# Patient Record
Sex: MALE | Race: WHITE | NOT HISPANIC OR LATINO | Employment: OTHER | URBAN - METROPOLITAN AREA
[De-identification: names, ages, dates, MRNs, and addresses within clinical notes are randomized per-mention and may not be internally consistent; named-entity substitution may affect disease eponyms.]

---

## 2018-08-19 ENCOUNTER — HOSPITAL ENCOUNTER (INPATIENT)
Facility: HOSPITAL | Age: 58
LOS: 10 days | Discharge: HOME | DRG: 286 | End: 2018-08-29
Attending: INTERNAL MEDICINE | Admitting: INTERNAL MEDICINE
Payer: MEDICARE

## 2018-08-19 DIAGNOSIS — I42.8 NICM (NONISCHEMIC CARDIOMYOPATHY): ICD-10-CM

## 2018-08-19 DIAGNOSIS — E78.2 MIXED HYPERLIPIDEMIA: ICD-10-CM

## 2018-08-19 DIAGNOSIS — I47.20 V-TACH: ICD-10-CM

## 2018-08-19 DIAGNOSIS — I50.9 ACUTE DECOMPENSATED HEART FAILURE: ICD-10-CM

## 2018-08-19 DIAGNOSIS — I50.43 ACUTE ON CHRONIC COMBINED SYSTOLIC AND DIASTOLIC HEART FAILURE: ICD-10-CM

## 2018-08-19 DIAGNOSIS — I48.91 ATRIAL FIBRILLATION: ICD-10-CM

## 2018-08-19 DIAGNOSIS — I10 HYPERTENSION, UNSPECIFIED TYPE: ICD-10-CM

## 2018-08-19 DIAGNOSIS — J43.9 PULMONARY EMPHYSEMA, UNSPECIFIED EMPHYSEMA TYPE: ICD-10-CM

## 2018-08-19 DIAGNOSIS — I48.20 CHRONIC ATRIAL FIBRILLATION: ICD-10-CM

## 2018-08-19 DIAGNOSIS — I50.9 HEART FAILURE: ICD-10-CM

## 2018-08-19 DIAGNOSIS — N17.9 ACUTE RENAL FAILURE, UNSPECIFIED ACUTE RENAL FAILURE TYPE: ICD-10-CM

## 2018-08-19 DIAGNOSIS — N17.9 ACUTE KIDNEY FAILURE: ICD-10-CM

## 2018-08-19 PROCEDURE — 20600001 HC STEP DOWN PRIVATE ROOM

## 2018-08-19 RX ORDER — DOXYCYCLINE 100 MG/1
100 CAPSULE ORAL EVERY 12 HOURS
Status: ON HOLD | COMMUNITY
End: 2018-08-29 | Stop reason: HOSPADM

## 2018-08-19 RX ORDER — DEXTROSE MONOHYDRATE 25 G/50ML
25 INJECTION, SOLUTION INTRAVENOUS DAILY PRN
COMMUNITY

## 2018-08-19 RX ORDER — MUPIROCIN CALCIUM 20 MG/G
CREAM TOPICAL 2 TIMES DAILY
COMMUNITY

## 2018-08-19 RX ORDER — FLUTICASONE FUROATE AND VILANTEROL 200; 25 UG/1; UG/1
1 POWDER RESPIRATORY (INHALATION) DAILY
COMMUNITY

## 2018-08-19 RX ORDER — FUROSEMIDE 20 MG/1
20 TABLET ORAL 2 TIMES DAILY
Status: ON HOLD | COMMUNITY
End: 2018-08-29 | Stop reason: SDUPTHER

## 2018-08-19 RX ORDER — GLUCAGON 1 MG/ML
KIT INJECTION
COMMUNITY

## 2018-08-19 RX ORDER — PANTOPRAZOLE SODIUM 40 MG/1
40 TABLET, DELAYED RELEASE ORAL DAILY
COMMUNITY

## 2018-08-19 RX ORDER — ONDANSETRON 4 MG/1
4 TABLET, FILM COATED ORAL
COMMUNITY

## 2018-08-19 RX ORDER — METOPROLOL SUCCINATE 25 MG/1
25 TABLET, EXTENDED RELEASE ORAL 3 TIMES DAILY
Status: ON HOLD | COMMUNITY
End: 2018-08-29 | Stop reason: SDUPTHER

## 2018-08-19 RX ORDER — IBUPROFEN 200 MG
1 TABLET ORAL
COMMUNITY

## 2018-08-20 PROBLEM — I47.20 V-TACH: Status: ACTIVE | Noted: 2018-08-20

## 2018-08-20 PROBLEM — I42.8 NICM (NONISCHEMIC CARDIOMYOPATHY): Status: ACTIVE | Noted: 2018-08-20

## 2018-08-20 PROBLEM — K21.9 GERD (GASTROESOPHAGEAL REFLUX DISEASE): Status: ACTIVE | Noted: 2018-08-20

## 2018-08-20 PROBLEM — J43.9 PULMONARY EMPHYSEMA: Status: ACTIVE | Noted: 2018-08-20

## 2018-08-20 PROBLEM — E78.5 HLD (HYPERLIPIDEMIA): Status: ACTIVE | Noted: 2018-08-20

## 2018-08-20 PROBLEM — I10 HYPERTENSION: Status: ACTIVE | Noted: 2018-08-20

## 2018-08-20 PROBLEM — N17.9 AKI (ACUTE KIDNEY INJURY): Status: ACTIVE | Noted: 2018-08-20

## 2018-08-20 PROBLEM — R57.0 CARDIOGENIC SHOCK: Status: ACTIVE | Noted: 2018-08-20

## 2018-08-20 PROBLEM — R79.89 ELEVATED LFTS: Status: ACTIVE | Noted: 2018-08-20

## 2018-08-20 PROBLEM — I48.91 A-FIB: Status: ACTIVE | Noted: 2018-08-20

## 2018-08-20 PROBLEM — F41.9 ANXIETY: Status: ACTIVE | Noted: 2018-08-20

## 2018-08-20 LAB
ALBUMIN SERPL BCP-MCNC: 3.2 G/DL
ALBUMIN SERPL BCP-MCNC: 3.2 G/DL
ALLENS TEST: ABNORMAL
ALP SERPL-CCNC: 95 U/L
ALP SERPL-CCNC: 95 U/L
ALT SERPL W/O P-5'-P-CCNC: 365 U/L
ALT SERPL W/O P-5'-P-CCNC: 365 U/L
ANION GAP SERPL CALC-SCNC: 10 MMOL/L
APTT BLDCRRT: 31.6 SEC
APTT BLDCRRT: 33.1 SEC
AST SERPL-CCNC: 51 U/L
AST SERPL-CCNC: 51 U/L
BASOPHILS # BLD AUTO: 0.02 K/UL
BASOPHILS # BLD AUTO: 0.02 K/UL
BASOPHILS NFR BLD: 0.2 %
BASOPHILS NFR BLD: 0.2 %
BILIRUB SERPL-MCNC: 1.2 MG/DL
BILIRUB SERPL-MCNC: 1.2 MG/DL
BILIRUB UR QL STRIP: NEGATIVE
BNP SERPL-MCNC: 1860 PG/ML
BUN SERPL-MCNC: 21 MG/DL
BUN SERPL-MCNC: 23 MG/DL
CALCIUM SERPL-MCNC: 9.2 MG/DL
CALCIUM SERPL-MCNC: 9.3 MG/DL
CALCIUM SERPL-MCNC: 9.4 MG/DL
CALCIUM SERPL-MCNC: 9.4 MG/DL
CHLORIDE SERPL-SCNC: 91 MMOL/L
CHLORIDE SERPL-SCNC: 96 MMOL/L
CHLORIDE SERPL-SCNC: 96 MMOL/L
CHLORIDE SERPL-SCNC: 97 MMOL/L
CHOLEST SERPL-MCNC: 97 MG/DL
CHOLEST/HDLC SERPL: 3.2 {RATIO}
CLARITY UR REFRACT.AUTO: CLEAR
CO2 SERPL-SCNC: 30 MMOL/L
CO2 SERPL-SCNC: 37 MMOL/L
COLOR UR AUTO: NORMAL
CREAT SERPL-MCNC: 1.1 MG/DL
CREAT SERPL-MCNC: 1.3 MG/DL
DELSYS: ABNORMAL
DIASTOLIC DYSFUNCTION: YES
DIFFERENTIAL METHOD: ABNORMAL
DIFFERENTIAL METHOD: ABNORMAL
EOSINOPHIL # BLD AUTO: 0.1 K/UL
EOSINOPHIL # BLD AUTO: 0.1 K/UL
EOSINOPHIL NFR BLD: 0.9 %
EOSINOPHIL NFR BLD: 0.9 %
ERYTHROCYTE [DISTWIDTH] IN BLOOD BY AUTOMATED COUNT: 16.8 %
ERYTHROCYTE [DISTWIDTH] IN BLOOD BY AUTOMATED COUNT: 16.8 %
EST. GFR  (AFRICAN AMERICAN): >60 ML/MIN/1.73 M^2
EST. GFR  (NON AFRICAN AMERICAN): >60 ML/MIN/1.73 M^2
ESTIMATED AVG GLUCOSE: 166 MG/DL
ESTIMATED PA SYSTOLIC PRESSURE: 51
FLOW: 2
GLUCOSE SERPL-MCNC: 131 MG/DL
GLUCOSE SERPL-MCNC: 132 MG/DL
GLUCOSE SERPL-MCNC: 132 MG/DL
GLUCOSE SERPL-MCNC: 212 MG/DL
GLUCOSE UR QL STRIP: NEGATIVE
HBA1C MFR BLD HPLC: 7.4 %
HCO3 UR-SCNC: 36.3 MMOL/L (ref 24–28)
HCT VFR BLD AUTO: 39.4 %
HCT VFR BLD AUTO: 39.4 %
HDLC SERPL-MCNC: 30 MG/DL
HDLC SERPL: 30.9 %
HGB BLD-MCNC: 12.6 G/DL
HGB BLD-MCNC: 12.6 G/DL
HGB UR QL STRIP: NEGATIVE
IMM GRANULOCYTES # BLD AUTO: 0.03 K/UL
IMM GRANULOCYTES # BLD AUTO: 0.03 K/UL
IMM GRANULOCYTES NFR BLD AUTO: 0.4 %
IMM GRANULOCYTES NFR BLD AUTO: 0.4 %
INR PPP: 1.6
KETONES UR QL STRIP: NEGATIVE
LACTATE SERPL-SCNC: 1 MMOL/L
LDLC SERPL CALC-MCNC: 56.4 MG/DL
LEUKOCYTE ESTERASE UR QL STRIP: NEGATIVE
LYMPHOCYTES # BLD AUTO: 1.9 K/UL
LYMPHOCYTES # BLD AUTO: 1.9 K/UL
LYMPHOCYTES NFR BLD: 22.9 %
LYMPHOCYTES NFR BLD: 22.9 %
MAGNESIUM SERPL-MCNC: 1.7 MG/DL
MAGNESIUM SERPL-MCNC: 1.7 MG/DL
MAGNESIUM SERPL-MCNC: 1.9 MG/DL
MCH RBC QN AUTO: 28.4 PG
MCH RBC QN AUTO: 28.4 PG
MCHC RBC AUTO-ENTMCNC: 32 G/DL
MCHC RBC AUTO-ENTMCNC: 32 G/DL
MCV RBC AUTO: 89 FL
MCV RBC AUTO: 89 FL
MITRAL VALVE REGURGITATION: ABNORMAL
MODE: ABNORMAL
MONOCYTES # BLD AUTO: 0.7 K/UL
MONOCYTES # BLD AUTO: 0.7 K/UL
MONOCYTES NFR BLD: 8.4 %
MONOCYTES NFR BLD: 8.4 %
NEUTROPHILS # BLD AUTO: 5.7 K/UL
NEUTROPHILS # BLD AUTO: 5.7 K/UL
NEUTROPHILS NFR BLD: 67.2 %
NEUTROPHILS NFR BLD: 67.2 %
NITRITE UR QL STRIP: NEGATIVE
NONHDLC SERPL-MCNC: 67 MG/DL
NRBC BLD-RTO: 0 /100 WBC
NRBC BLD-RTO: 0 /100 WBC
PCO2 BLDA: 50.3 MMHG (ref 35–45)
PH SMN: 7.47 [PH] (ref 7.35–7.45)
PH UR STRIP: 7 [PH] (ref 5–8)
PHOSPHATE SERPL-MCNC: 3.9 MG/DL
PHOSPHATE SERPL-MCNC: 3.9 MG/DL
PLATELET # BLD AUTO: 81 K/UL
PLATELET # BLD AUTO: 81 K/UL
PMV BLD AUTO: 9.7 FL
PMV BLD AUTO: 9.7 FL
PO2 BLDA: 70 MMHG (ref 40–60)
POC BE: 13 MMOL/L
POC SATURATED O2: 94 % (ref 95–100)
POC TCO2: 38 MMOL/L (ref 24–29)
POCT GLUCOSE: 102 MG/DL (ref 70–110)
POCT GLUCOSE: 112 MG/DL (ref 70–110)
POCT GLUCOSE: 126 MG/DL (ref 70–110)
POCT GLUCOSE: 130 MG/DL (ref 70–110)
POCT GLUCOSE: 290 MG/DL (ref 70–110)
POTASSIUM SERPL-SCNC: 4.1 MMOL/L
POTASSIUM SERPL-SCNC: 4.8 MMOL/L
POTASSIUM SERPL-SCNC: 4.8 MMOL/L
POTASSIUM SERPL-SCNC: 5 MMOL/L
PROT SERPL-MCNC: 6.1 G/DL
PROT SERPL-MCNC: 6.1 G/DL
PROT UR QL STRIP: NEGATIVE
PROTHROMBIN TIME: 15.8 SEC
RBC # BLD AUTO: 4.43 M/UL
RBC # BLD AUTO: 4.43 M/UL
RETIRED EF AND QEF - SEE NOTES: 15 (ref 55–65)
SAMPLE: ABNORMAL
SITE: ABNORMAL
SODIUM SERPL-SCNC: 136 MMOL/L
SODIUM SERPL-SCNC: 136 MMOL/L
SODIUM SERPL-SCNC: 137 MMOL/L
SODIUM SERPL-SCNC: 138 MMOL/L
SP GR UR STRIP: 1.01 (ref 1–1.03)
TRICUSPID VALVE REGURGITATION: ABNORMAL
TRIGL SERPL-MCNC: 53 MG/DL
TROPONIN I SERPL DL<=0.01 NG/ML-MCNC: 0.03 NG/ML
TSH SERPL DL<=0.005 MIU/L-ACNC: 2.32 UIU/ML
URN SPEC COLLECT METH UR: NORMAL
UROBILINOGEN UR STRIP-ACNC: NEGATIVE EU/DL
WBC # BLD AUTO: 8.48 K/UL
WBC # BLD AUTO: 8.48 K/UL

## 2018-08-20 PROCEDURE — S4991 NICOTINE PATCH NONLEGEND: HCPCS | Performed by: STUDENT IN AN ORGANIZED HEALTH CARE EDUCATION/TRAINING PROGRAM

## 2018-08-20 PROCEDURE — 83880 ASSAY OF NATRIURETIC PEPTIDE: CPT

## 2018-08-20 PROCEDURE — 84100 ASSAY OF PHOSPHORUS: CPT

## 2018-08-20 PROCEDURE — 63600175 PHARM REV CODE 636 W HCPCS: Performed by: STUDENT IN AN ORGANIZED HEALTH CARE EDUCATION/TRAINING PROGRAM

## 2018-08-20 PROCEDURE — 85025 COMPLETE CBC W/AUTO DIFF WBC: CPT

## 2018-08-20 PROCEDURE — 99900035 HC TECH TIME PER 15 MIN (STAT)

## 2018-08-20 PROCEDURE — 85730 THROMBOPLASTIN TIME PARTIAL: CPT | Mod: 91

## 2018-08-20 PROCEDURE — 93306 TTE W/DOPPLER COMPLETE: CPT

## 2018-08-20 PROCEDURE — 83605 ASSAY OF LACTIC ACID: CPT

## 2018-08-20 PROCEDURE — 99222 1ST HOSP IP/OBS MODERATE 55: CPT | Mod: ,,, | Performed by: INTERNAL MEDICINE

## 2018-08-20 PROCEDURE — 83735 ASSAY OF MAGNESIUM: CPT

## 2018-08-20 PROCEDURE — 80048 BASIC METABOLIC PNL TOTAL CA: CPT

## 2018-08-20 PROCEDURE — 84484 ASSAY OF TROPONIN QUANT: CPT

## 2018-08-20 PROCEDURE — 80048 BASIC METABOLIC PNL TOTAL CA: CPT | Mod: 91

## 2018-08-20 PROCEDURE — 84443 ASSAY THYROID STIM HORMONE: CPT

## 2018-08-20 PROCEDURE — 93306 TTE W/DOPPLER COMPLETE: CPT | Mod: 26,,, | Performed by: INTERNAL MEDICINE

## 2018-08-20 PROCEDURE — 93010 ELECTROCARDIOGRAM REPORT: CPT | Mod: ,,, | Performed by: INTERNAL MEDICINE

## 2018-08-20 PROCEDURE — 80053 COMPREHEN METABOLIC PANEL: CPT

## 2018-08-20 PROCEDURE — 83735 ASSAY OF MAGNESIUM: CPT | Mod: 91

## 2018-08-20 PROCEDURE — 36415 COLL VENOUS BLD VENIPUNCTURE: CPT

## 2018-08-20 PROCEDURE — 63600175 PHARM REV CODE 636 W HCPCS: Performed by: PHYSICIAN ASSISTANT

## 2018-08-20 PROCEDURE — 93005 ELECTROCARDIOGRAM TRACING: CPT

## 2018-08-20 PROCEDURE — 85730 THROMBOPLASTIN TIME PARTIAL: CPT

## 2018-08-20 PROCEDURE — 80061 LIPID PANEL: CPT

## 2018-08-20 PROCEDURE — 25000242 PHARM REV CODE 250 ALT 637 W/ HCPCS: Performed by: STUDENT IN AN ORGANIZED HEALTH CARE EDUCATION/TRAINING PROGRAM

## 2018-08-20 PROCEDURE — 99223 1ST HOSP IP/OBS HIGH 75: CPT | Mod: ,,, | Performed by: INTERNAL MEDICINE

## 2018-08-20 PROCEDURE — 83036 HEMOGLOBIN GLYCOSYLATED A1C: CPT

## 2018-08-20 PROCEDURE — 85610 PROTHROMBIN TIME: CPT

## 2018-08-20 PROCEDURE — A4216 STERILE WATER/SALINE, 10 ML: HCPCS | Performed by: STUDENT IN AN ORGANIZED HEALTH CARE EDUCATION/TRAINING PROGRAM

## 2018-08-20 PROCEDURE — 25000003 PHARM REV CODE 250: Performed by: STUDENT IN AN ORGANIZED HEALTH CARE EDUCATION/TRAINING PROGRAM

## 2018-08-20 PROCEDURE — 81003 URINALYSIS AUTO W/O SCOPE: CPT

## 2018-08-20 PROCEDURE — 25000003 PHARM REV CODE 250: Performed by: PHYSICIAN ASSISTANT

## 2018-08-20 PROCEDURE — 20600001 HC STEP DOWN PRIVATE ROOM

## 2018-08-20 PROCEDURE — 82803 BLOOD GASES ANY COMBINATION: CPT

## 2018-08-20 RX ORDER — FUROSEMIDE 10 MG/ML
80 INJECTION INTRAMUSCULAR; INTRAVENOUS ONCE
Status: COMPLETED | OUTPATIENT
Start: 2018-08-20 | End: 2018-08-20

## 2018-08-20 RX ORDER — MAGNESIUM SULFATE HEPTAHYDRATE 40 MG/ML
2 INJECTION, SOLUTION INTRAVENOUS ONCE
Status: COMPLETED | OUTPATIENT
Start: 2018-08-20 | End: 2018-08-20

## 2018-08-20 RX ORDER — ACETAMINOPHEN 325 MG/1
650 TABLET ORAL EVERY 4 HOURS PRN
Status: DISCONTINUED | OUTPATIENT
Start: 2018-08-20 | End: 2018-08-29 | Stop reason: HOSPADM

## 2018-08-20 RX ORDER — LANOLIN ALCOHOL/MO/W.PET/CERES
400 CREAM (GRAM) TOPICAL DAILY
Status: DISCONTINUED | OUTPATIENT
Start: 2018-08-20 | End: 2018-08-21

## 2018-08-20 RX ORDER — POLYETHYLENE GLYCOL 3350 17 G/17G
17 POWDER, FOR SOLUTION ORAL DAILY
Status: DISCONTINUED | OUTPATIENT
Start: 2018-08-20 | End: 2018-08-29 | Stop reason: HOSPADM

## 2018-08-20 RX ORDER — HEPARIN SODIUM,PORCINE/D5W 25000/250
12 INTRAVENOUS SOLUTION INTRAVENOUS CONTINUOUS
Status: DISCONTINUED | OUTPATIENT
Start: 2018-08-20 | End: 2018-08-23

## 2018-08-20 RX ORDER — GLUCAGON 1 MG
1 KIT INJECTION
Status: DISCONTINUED | OUTPATIENT
Start: 2018-08-20 | End: 2018-08-29 | Stop reason: HOSPADM

## 2018-08-20 RX ORDER — ENOXAPARIN SODIUM 100 MG/ML
1 INJECTION SUBCUTANEOUS ONCE
Status: DISCONTINUED | OUTPATIENT
Start: 2018-08-20 | End: 2018-08-20

## 2018-08-20 RX ORDER — PANTOPRAZOLE SODIUM 40 MG/1
40 TABLET, DELAYED RELEASE ORAL DAILY
Status: DISCONTINUED | OUTPATIENT
Start: 2018-08-20 | End: 2018-08-29 | Stop reason: HOSPADM

## 2018-08-20 RX ORDER — SODIUM CHLORIDE 0.9 % (FLUSH) 0.9 %
3 SYRINGE (ML) INJECTION EVERY 8 HOURS
Status: DISCONTINUED | OUTPATIENT
Start: 2018-08-20 | End: 2018-08-29 | Stop reason: HOSPADM

## 2018-08-20 RX ORDER — IBUPROFEN 200 MG
1 TABLET ORAL
Status: DISCONTINUED | OUTPATIENT
Start: 2018-08-20 | End: 2018-08-20

## 2018-08-20 RX ORDER — ALBUTEROL SULFATE 90 UG/1
2 AEROSOL, METERED RESPIRATORY (INHALATION) EVERY 6 HOURS PRN
Status: DISCONTINUED | OUTPATIENT
Start: 2018-08-20 | End: 2018-08-29 | Stop reason: HOSPADM

## 2018-08-20 RX ORDER — IBUPROFEN 200 MG
1 TABLET ORAL
Status: DISCONTINUED | OUTPATIENT
Start: 2018-08-20 | End: 2018-08-29 | Stop reason: HOSPADM

## 2018-08-20 RX ORDER — ONDANSETRON 4 MG/1
4 TABLET, FILM COATED ORAL EVERY 6 HOURS PRN
Status: DISCONTINUED | OUTPATIENT
Start: 2018-08-20 | End: 2018-08-29 | Stop reason: HOSPADM

## 2018-08-20 RX ORDER — FUROSEMIDE 20 MG/1
20 TABLET ORAL 2 TIMES DAILY
Status: DISCONTINUED | OUTPATIENT
Start: 2018-08-20 | End: 2018-08-20

## 2018-08-20 RX ORDER — INSULIN ASPART 100 [IU]/ML
0-5 INJECTION, SOLUTION INTRAVENOUS; SUBCUTANEOUS EVERY 6 HOURS PRN
Status: DISCONTINUED | OUTPATIENT
Start: 2018-08-20 | End: 2018-08-29 | Stop reason: HOSPADM

## 2018-08-20 RX ORDER — FLUTICASONE FUROATE AND VILANTEROL 200; 25 UG/1; UG/1
1 POWDER RESPIRATORY (INHALATION) DAILY
Status: DISCONTINUED | OUTPATIENT
Start: 2018-08-20 | End: 2018-08-29 | Stop reason: HOSPADM

## 2018-08-20 RX ORDER — METOPROLOL SUCCINATE 25 MG/1
25 TABLET, EXTENDED RELEASE ORAL DAILY
Status: DISCONTINUED | OUTPATIENT
Start: 2018-08-20 | End: 2018-08-20

## 2018-08-20 RX ORDER — FUROSEMIDE 10 MG/ML
80 INJECTION INTRAMUSCULAR; INTRAVENOUS 2 TIMES DAILY
Status: DISCONTINUED | OUTPATIENT
Start: 2018-08-20 | End: 2018-08-20

## 2018-08-20 RX ADMIN — FUROSEMIDE 10 MG/HR: 10 INJECTION, SOLUTION INTRAMUSCULAR; INTRAVENOUS at 10:08

## 2018-08-20 RX ADMIN — MAGNESIUM SULFATE IN WATER 2 G: 40 INJECTION, SOLUTION INTRAVENOUS at 04:08

## 2018-08-20 RX ADMIN — POLYETHYLENE GLYCOL 3350 17 G: 17 POWDER, FOR SOLUTION ORAL at 09:08

## 2018-08-20 RX ADMIN — FUROSEMIDE 80 MG: 10 INJECTION, SOLUTION INTRAMUSCULAR; INTRAVENOUS at 10:08

## 2018-08-20 RX ADMIN — PANTOPRAZOLE SODIUM 40 MG: 40 TABLET, DELAYED RELEASE ORAL at 09:08

## 2018-08-20 RX ADMIN — Medication 3 ML: at 09:08

## 2018-08-20 RX ADMIN — HEPARIN SODIUM AND DEXTROSE 12 UNITS/KG/HR: 10000; 5 INJECTION INTRAVENOUS at 12:08

## 2018-08-20 RX ADMIN — HEPARIN SODIUM AND DEXTROSE 14 UNITS/KG/HR: 10000; 5 INJECTION INTRAVENOUS at 07:08

## 2018-08-20 RX ADMIN — MAGNESIUM OXIDE TAB 400 MG (241.3 MG ELEMENTAL MG) 400 MG: 400 (241.3 MG) TAB at 09:08

## 2018-08-20 RX ADMIN — AMIODARONE HYDROCHLORIDE 150 MG: 1.5 INJECTION, SOLUTION INTRAVENOUS at 08:08

## 2018-08-20 RX ADMIN — AMIODARONE HYDROCHLORIDE 1 MG/MIN: 1.8 INJECTION, SOLUTION INTRAVENOUS at 08:08

## 2018-08-20 RX ADMIN — NICOTINE 1 PATCH: 21 PATCH, EXTENDED RELEASE TRANSDERMAL at 09:08

## 2018-08-20 RX ADMIN — INSULIN ASPART 3 UNITS: 100 INJECTION, SOLUTION INTRAVENOUS; SUBCUTANEOUS at 06:08

## 2018-08-20 RX ADMIN — FLUTICASONE FUROATE AND VILANTEROL TRIFENATATE 1 PUFF: 200; 25 POWDER RESPIRATORY (INHALATION) at 09:08

## 2018-08-20 NOTE — HPI
Mr. Harley is a 58 year old male with a PMH significant for HTN, HLD, PE on Xarelto, MARK, GERD, schizophrenia, ARI thrombus, NICM (EF 10-15%) s/p St[employer] Thai single lead-ICD 8/11/18.    He was recently admitted at [facility] 8/14/18- 8/19/18 for cardiogenic shock. Initially presented for ICD placement and tolerate procedure well, discharged home the next day. He returned with worsening shortness of breath, tachycardia. His metoprolol and sotalol was increased and again discharged home following day. That evening he started felling weak with chills, presented to ER and round to have HARPAL, elevated LFTs, cardiogenic shock SBP 60-80s. Started on Dobutamine ggt and admitted to ICU. He gradually improved, dobutamine was weaned off. Interrogation of his ICD per report noted SVT in 230s. Unable to tolerate metoprolol secondary to hypotension. Patient seen by Cardiology and recommended further evaluation at [facility] for LVAD.    Admitted to [facility] [place] for acute decompensated heart failure and LVAD evaluation.       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 He was first dx of HF at [facility] 01/15/18 presented with AECHF and LLL PNA. Had a LHC completed which noted non-obstructive CAD (no reports in file of this). Follows up with Cardiology Dr. Montana in [place]. Xarelto last dose 08/19/19 0800 for Afib/ AFL. Since ICD placement it has fired x1 two days ago, [employer] Thai interrogation on 08/18/18 revealed Mode VVI, HR 40BPM, VT-1 at 166NPM to monitor, VT-2 181BPM with ATP x3 followed by DCCV, VF 230BPM ATP x1 followed by DCCV (VT rate upto 230BPM into VF zone with failure of ATP x1 and subsequent 30j dccv on 8/18/18 at 11:32am)         Work up at [facility]:   RUQ us 8/15/18 consistent with some degree of cirrhosis with  portal HtN.   Cr 0.9, ALB 3.1, T bili 1.3, AST//614, CPK 63, Mag 1.9, Digoxin 0.87, INR 3.9, Troponin 0.04, BNP 1684, Hep C / Hep B negative,   Medications held Digoxin 0.25mg, Sotalol 80mg BID, Lipitor 40mg, Doxazosin 4mg,         EKG: Sinus tachycardia, RBBB, with frequent PVCs, ,

## 2018-08-20 NOTE — ASSESSMENT & PLAN NOTE
Currently in NSR on EKG, hx of AFL / Afib on Xarelto   - Maintain K > 4, Mag > 2 and Ca/iCal WNL to decrease arrhythmogenic potential  - Rate control with toprol XL 25mg Qday  - Anticoagulation with Xarelto, while in patient will start Lovenox 1mg/kg (Pending INR need to <2 prior to Lovenox)    --> [id number] score 3     --> HASBLED score of 3 with a 3.74% risk of bleeding  - Maintain on telemetry and daily morning EKGs for the next few days

## 2018-08-20 NOTE — ASSESSMENT & PLAN NOTE
S/p Fortify [employer] [facility] DC-ICD  VR [id number] ICD by Dr. Johnson  On 08/10/18  - Continue Toprol XL 25mg qday for now  - Closely monitor electrolytes   - Consider EP consult in AM

## 2018-08-20 NOTE — H&P
[facility]  Heart Transplant  H&P    Patient Name: Colton Harley  MRN: [id number]  Admission Date: 8/19/2018  Attending Physician: Venessa Villatoro MD  Primary Care Provider: Primary Doctor No  Principal Problem:Cardiogenic shock    Subjective:     History of Present Illness:  Mr. Harley is a 58 yoM, admitted to HTS service with acute decompensated HF and LVAD work up. 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 Thai DC-ICD (08/11/2018), ARI thrombus, COPD with Former smoker 1.5ppd x 50 years (quit 10 days ago), NIDDM type II, Hypertension, HLD, SVT, PE on Xarelto, MARK not on Cpap, GERD, cocaine use, schizophrenia, medication noncompliance. Admitted at [facility] from 8/14/18- 8/19/18 with cardiogenic shock leading to HARPAL, elevated LFTs, recurrent VT/Afib/AFL. Initially p/w for ICD placement, which was uncomplicated and d/c home following day. Day later p/w increase SOB, acute CHF and tachycardiac, his metoprolol and sotalol was increased and again discharged home following day. That evening he started felling weak with chills, presented to ER and round to have HARPAL, elevated LFTs, cardiogenic shock SBP 60-80s. Started on Dobutamine ggt and admitted to ICU, course of his admission dobutamine was weaned off with initiation of metoprolol lead to symptomatic hypotension. Interrogation of his ICD per report noted SVT in 230s. Patient seen by Cardiology and recommend further evaluation at [facility] for LVAD, poor prognosis with NYHA III/IV with significant MCCLAIN/taxing with minimal activity and further leads to SVT with subsequent shocks. Also seen by GI for elevated LFTs, likely d/t passive congestive hepatopathy, recommended to optimize HF.     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 He was first dx of HF at [facility] 01/15/18 presented with AECHF and LLL PNA. Had a LHC completed which noted non-obstructive CAD (no reports in file of this). Follows up with Cardiology Dr. Montana in [place]. Xarelto last dose 08/19/19 0800 for Afib/ AFL. Since ICD placement it has fired x1 two days ago, [facility] Thai interrogation on 08/18/18 revealed Mode VVI, HR 40BPM, VT-1 at 166NPM to monitor, VT-2 181BPM with ATP x3 followed by DCCV, VF 230BPM ATP x1 followed by DCCV (VT rate upto 230BPM into VF zone with failure of ATP x1 and subsequent 30j dccv on 8/18/18 at 11:32am)       Work up at [facility]:   RUQ us 8/15/18 consistent with some degree of cirrhosis with portal HtN.   Cr 0.9, ALB 3.1, T bili 1.3, AST//614, CPK 63, Mag 1.9, Digoxcin 0.87, INR 3.9, Troponin 0.04, BNP 1684, Hep C / Hep B negative,   Medications held Digoxin 0.25mg, Sotalol 80mg BID, Lipitor 40mg, Doxazosin 4mg,       EKG: ST with frequent PVCs, , RBBB with , Left axis deviation, inferior lead Q-waves noted,     Past Medical History:   Diagnosis Date    Anticoagulant long-term use     Asthma     CHF (congestive heart failure)     COPD (chronic obstructive pulmonary disease)     Diabetes mellitus     Hypertension        Past Surgical History:   Procedure Laterality Date    BACK SURGERY      titanium rods in neck    TONSILLECTOMY      VASECTOMY         Review of patient's allergies indicates:   Allergen Reactions    Buspirone Palpitations       Current Facility-Administered Medications   Medication    acetaminophen tablet 650 mg    albuterol inhaler 2 puff    dextrose 50% injection 12.5 g    fluticasone-vilanterol 200-25 mcg/dose diskus inhaler 1 puff    furosemide tablet 20 mg    glucagon (human recombinant) injection 1 mg    insulin aspart U-100 pen 0-5 Units    metoprolol succinate (TOPROL-XL) 24 hr tablet 25 mg    nicotine 21 mg/24 hr 1 patch    ondansetron tablet 4 mg    pantoprazole EC tablet 40 mg     polyethylene glycol packet 17 g    sodium chloride 0.9% flush 3 mL     Family History     Problem Relation (Age of Onset)    Cancer Mother, Brother, Maternal Grandfather, Paternal Grandmother    Diabetes Father    Heart disease Father, Brother    Stroke Mother        Tobacco Use    Smoking status: Former Smoker     Packs/day: 1.50     Years: 53.00     Pack years: 79.50     Start date: 1968     Last attempt to quit: 8/10/2018     Years since quittin.0    Smokeless tobacco: Former User     Types: Chew     Quit date: 2009    Tobacco comment: pt has nicotine patch on   Substance and Sexual Activity    Alcohol use: No     Frequency: Never    Drug use: No    Sexual activity: Not Currently     Partners: Female     Review of Systems   Constitutional: Positive for activity change and fatigue. Negative for appetite change, chills and diaphoresis.   HENT: Negative for congestion.    Eyes: Negative for discharge and itching.   Respiratory: Positive for cough, chest tightness and shortness of breath. Negative for apnea and choking.    Cardiovascular: Positive for palpitations and leg swelling. Negative for chest pain.   Gastrointestinal: Negative for abdominal distention, abdominal pain, diarrhea, nausea and vomiting.   Endocrine: Negative for cold intolerance and heat intolerance.   Genitourinary: Negative for difficulty urinating and dysuria.   Musculoskeletal: Negative for arthralgias, back pain and gait problem.   Skin: Negative for color change and pallor.   Neurological: Negative for dizziness, facial asymmetry, light-headedness and headaches.   Psychiatric/Behavioral: Negative for agitation, behavioral problems and confusion.     Objective:     Vital Signs (Most Recent):  Temp: 98.1 °F (36.7 °C) (18)  Pulse: (!) 111 (18)  Resp: 18 (18)  BP: 119/86 (18)  SpO2: 95 % (18) Vital Signs (24h Range):  Temp:  [97.9 °F (36.6 °C)-98.1 °F (36.7 °C)] 98.1 °F  (36.7 °C)  Pulse:  [] 111  Resp:  [18-20] 18  SpO2:  [95 %-99 %] 95 %  BP: (107-119)/(74-86) 119/86     Patient Vitals for the past 72 hrs (Last 3 readings):   Weight   08/19/18 2350 73.1 kg (161 lb 2.5 oz)     Body mass index is 25.24 kg/m².    No intake or output data in the 24 hours ending 08/20/18 0057    Physical Exam   Constitutional: He is oriented to person, place, and time. He appears well-developed and well-nourished. No distress.   HENT:   Head: Normocephalic.   Neck: Normal range of motion. JVD present.   Cardiovascular: Regular rhythm, normal heart sounds and intact distal pulses. Tachycardia present. Exam reveals no gallop and no friction rub.   No murmur heard.  Pulses:       Carotid pulses are 2+ on the right side, and 2+ on the left side.       Radial pulses are 2+ on the right side, and 2+ on the left side.        Femoral pulses are 2+ on the right side, and 2+ on the left side.       Popliteal pulses are 2+ on the right side, and 2+ on the left side.        Dorsalis pedis pulses are 2+ on the right side, and 2+ on the left side.        Posterior tibial pulses are 2+ on the right side, and 2+ on the left side.   Pulmonary/Chest: No stridor. He is in respiratory distress. He has decreased breath sounds in the right middle field and the right lower field. He has wheezes. He has rales. He exhibits no tenderness.   Abdominal: Soft. Bowel sounds are normal. He exhibits no distension and no mass. There is no tenderness. There is no rebound and no guarding. A hernia is present. Hernia confirmed positive in the right inguinal area.   Musculoskeletal: Normal range of motion. He exhibits edema. He exhibits no tenderness or deformity.   Neurological: He is alert and oriented to person, place, and time.   Skin: Skin is warm. Capillary refill takes less than 2 seconds. No rash noted. He is not diaphoretic. No erythema. No pallor.        Psychiatric: He has a normal mood and affect.       Significant  Labs:  CBC:  No results for input(s): WBC, RBC, HGB, HCT, PLT, MCV, MCH, MCHC in the last 168 hours.  BNP:  No results for input(s): BNP in the last 168 hours.    Invalid input(s): BNPTRIAGELBLO  CMP:  No results for input(s): GLU, CALCIUM, ALBUMIN, PROT, NA, K, CO2, CL, BUN, CREATININE, ALKPHOS, ALT, AST, BILITOT in the last 168 hours.   Coagulation:   No results for input(s): PT, INR, APTT in the last 168 hours.  LDH:  No results for input(s): LDH in the last 72 hours.  Microbiology:  Microbiology Results (last 7 days)     ** No results found for the last 168 hours. **          BMP: No results for input(s): GLU, NA, K, CL, CO2, BUN, CREATININE, CALCIUM, MG in the last 72 hours.  Cardiac Markers: No results for input(s): CKMB, TROPONINT, MYOGLOBIN in the last 72 hours.  Coagulation: No results for input(s): PT, INR, APTT in the last 72 hours.  Prealbumin: No results for input(s): PREALBUMIN in the last 72 hours.  Microbiology Results (last 7 days)     ** No results found for the last 168 hours. **        I have reviewed all pertinent labs within the past 24 hours.    Diagnostic Results:  CT: No results found in the last 24 hours.  CXR: No results found in the last 24 hours.  U/S: No results found in the last 24 hours.  Echo: I have reviewed all pertinent results/findings within the past 24 hours and my personal findings are:    EKG: I have reviewed all pertinent results/findings within the past 24 hours and my personal findings are:     Assessment/Plan:     * Cardiogenic shock    Mr. Harley is a 58 yoM, admitted to \[facility]\"" service with acute decompensated HF and LVAD work up. 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 Thai DC-ICD (08/11/2018), ARI thrombus, COPD with Former smoker 1.5ppd x 50 years (quit 10 days ago), NIDDM type II, Hypertension, HLD, SVT, PE on Xarelto, MARK not on Cpap, GERD, cocaine use, schizophrenia, medication noncompliance. Admitted at [facility] from 8/14/18- 8/19/18 with cardiogenic  shock leading to HARPAL, elevated LFTs, recurrent VT/Afib/AFL. Who is a transfer for LVAD evaluation from [facility] with cardiogenic shock, ongoing SVT/VT with ICD shocks and difficult to wean off dobutamine.    - Hx of LHC 2017 per patient with unremarkable coronary   - EKG: ST with frequent PVCs, , RBBB with , Left axis deviation, inferior lead Q-waves noted,     - Admit to HTS service,   - Complete blood work with CBC, CMP, INR, BNP  - Follow up CXR  - Will have him NPO incase further work up planned for today   - Continue Lasix 20mg BID IV, closely monitor hemodynamics  - Will need C / C further evaluate for LVAD  - Toprol XL 25mg Qday ( OSH TID but developed hypotension on this)   - Consider RIJ placement for CVP and calculate hemodynamics   - Ordered completed Echo with CFD / bubble study   - Fluid restriction at 1000 cc with strict I/Os and daily STANDING weights  - Maintain on telemetry and daily EKGs   - Up to date risk stratification : TSH, Lipids, HbA1c with optimization of risk factors is necessary  - Check Electrolytes, keep Mag >2 & K+ >4  - SCDs, TEDs, Nursing communication to elevated LE   - Ambulate as tolerated, PT / OT ordered              Elevated LFTs    In the setting of congestive hepatopathy, seen by GI OSH.   - Hep B/C non reactive  - Liver u/s consistent with cirrhosis and portal HTN  - Holding statin therapy         GERD (gastroesophageal reflux disease)    - continue PPI         A-fib    Currently in NSR on EKG, hx of AFL / Afib on Xarelto   - Maintain K > 4, Mag > 2 and Ca/iCal WNL to decrease arrhythmogenic potential  - Rate control with toprol XL 25mg Qday  - Anticoagulation with Xarelto, while in patient will start Lovenox 1mg/kg (Pending INR need to <2 prior to Lovenox)    --> [id number] score 3     --> HASBLED score of 3 with a 3.74% risk of bleeding  - Maintain on telemetry and daily morning EKGs for the next few days            V-tach    S/p Fortify  [place] [facility] DC-ICD  VR 1357-40Q ICD by Dr. Johnson  On 08/10/18  - Continue Toprol XL 25mg qday for now  - Closely monitor electrolytes   - Consider EP consult in AM         HLD (hyperlipidemia)    - Follow up CMP if LFTs down trending, okay with low potency statins lipitor 20mg        Hypertension    Hypotensive at OSH, Holding Enteresto for now  - initial vitals wnl, continue to monitor  - low dose Toprol XL titrate up as tolerated  - Will initiate ACEi once BMP wnl and renal function stable   - Low salt DASH diet         Pulmonary emphysema    -Pt currently smoking 1.5 PPD for the past 50 years  -Pending CXR   -May benefit from Pulmonary evaluation, with his VT possible Amiodarone would be necessary, for clearance  -After counseling, quit 10 days ago, continue to address  -Continue Nicotine patch  will defer this to PCP  -Resume Inhalers Breo, Albuterol,   -Wean off oxygen as tolerated  -IS / AF              HARPAL (acute kidney injury)    Cr 0.9 ->1 OSH  - Caution with Lasix IV  - Hold Nephrotoxic medications, no Contrast, Keep normotensive and euvolemic    - Closely monitor Cr, BUN  - If does not improve please obtain Urinelytes, UACC, Renal U/s            NICM (nonischemic cardiomyopathy)    CM to assist with obtaining C records and films         Acute decompensated heart failure    Please see Cardiogenic shock. Elevated BNP,             Jocelyn De León MD  Heart Transplant  [facility]

## 2018-08-20 NOTE — SUBJECTIVE & OBJECTIVE
Past Medical History:   Diagnosis Date    Anticoagulant long-term use     Asthma     CHF (congestive heart failure)     COPD (chronic obstructive pulmonary disease)     Diabetes mellitus     Hypertension        Past Surgical History:   Procedure Laterality Date    BACK SURGERY      titanium rods in neck    TONSILLECTOMY      VASECTOMY         Review of patient's allergies indicates:   Allergen Reactions    Buspirone Palpitations       Current Facility-Administered Medications   Medication    acetaminophen tablet 650 mg    albuterol inhaler 2 puff    dextrose 50% injection 12.5 g    fluticasone-vilanterol 200-25 mcg/dose diskus inhaler 1 puff    furosemide tablet 20 mg    glucagon (human recombinant) injection 1 mg    insulin aspart U-100 pen 0-5 Units    metoprolol succinate (TOPROL-XL) 24 hr tablet 25 mg    nicotine 21 mg/24 hr 1 patch    ondansetron tablet 4 mg    pantoprazole EC tablet 40 mg    polyethylene glycol packet 17 g    sodium chloride 0.9% flush 3 mL     Family History     Problem Relation (Age of Onset)    Cancer Mother, Brother, Maternal Grandfather, Paternal Grandmother    Diabetes Father    Heart disease Father, Brother    Stroke Mother        Tobacco Use    Smoking status: Former Smoker     Packs/day: 1.50     Years: 53.00     Pack years: 79.50     Start date: 1968     Last attempt to quit: 8/10/2018     Years since quittin.0    Smokeless tobacco: Former User     Types: Chew     Quit date: 2009    Tobacco comment: pt has nicotine patch on   Substance and Sexual Activity    Alcohol use: No     Frequency: Never    Drug use: No    Sexual activity: Not Currently     Partners: Female     Review of Systems   Constitutional: Positive for activity change and fatigue. Negative for appetite change, chills and diaphoresis.   HENT: Negative for congestion.    Eyes: Negative for discharge and itching.   Respiratory: Positive for cough, chest tightness and shortness of  breath. Negative for apnea and choking.    Cardiovascular: Positive for palpitations and leg swelling. Negative for chest pain.   Gastrointestinal: Negative for abdominal distention, abdominal pain, diarrhea, nausea and vomiting.   Endocrine: Negative for cold intolerance and heat intolerance.   Genitourinary: Negative for difficulty urinating and dysuria.   Musculoskeletal: Negative for arthralgias, back pain and gait problem.   Skin: Negative for color change and pallor.   Neurological: Negative for dizziness, facial asymmetry, light-headedness and headaches.   Psychiatric/Behavioral: Negative for agitation, behavioral problems and confusion.     Objective:     Vital Signs (Most Recent):  Temp: 98.1 °F (36.7 °C) (08/19/18 2350)  Pulse: (!) 111 (08/19/18 2350)  Resp: 18 (08/19/18 2350)  BP: 119/86 (08/19/18 2350)  SpO2: 95 % (08/19/18 2350) Vital Signs (24h Range):  Temp:  [97.9 °F (36.6 °C)-98.1 °F (36.7 °C)] 98.1 °F (36.7 °C)  Pulse:  [] 111  Resp:  [18-20] 18  SpO2:  [95 %-99 %] 95 %  BP: (107-119)/(74-86) 119/86     Patient Vitals for the past 72 hrs (Last 3 readings):   Weight   08/19/18 2350 73.1 kg (161 lb 2.5 oz)     Body mass index is 25.24 kg/m².    No intake or output data in the 24 hours ending 08/20/18 0057    Physical Exam   Constitutional: He is oriented to person, place, and time. He appears well-developed and well-nourished. No distress.   HENT:   Head: Normocephalic.   Neck: Normal range of motion. JVD present.   Cardiovascular: Regular rhythm, normal heart sounds and intact distal pulses. Tachycardia present. Exam reveals no gallop and no friction rub.   No murmur heard.  Pulses:       Carotid pulses are 2+ on the right side, and 2+ on the left side.       Radial pulses are 2+ on the right side, and 2+ on the left side.        Femoral pulses are 2+ on the right side, and 2+ on the left side.       Popliteal pulses are 2+ on the right side, and 2+ on the left side.        Dorsalis pedis  pulses are 2+ on the right side, and 2+ on the left side.        Posterior tibial pulses are 2+ on the right side, and 2+ on the left side.   Pulmonary/Chest: No stridor. He is in respiratory distress. He has decreased breath sounds in the right middle field and the right lower field. He has wheezes. He has rales. He exhibits no tenderness.   Abdominal: Soft. Bowel sounds are normal. He exhibits no distension and no mass. There is no tenderness. There is no rebound and no guarding. A hernia is present. Hernia confirmed positive in the right inguinal area.   Musculoskeletal: Normal range of motion. He exhibits edema. He exhibits no tenderness or deformity.   Neurological: He is alert and oriented to person, place, and time.   Skin: Skin is warm. Capillary refill takes less than 2 seconds. No rash noted. He is not diaphoretic. No erythema. No pallor.        Psychiatric: He has a normal mood and affect.       Significant Labs:  CBC:  No results for input(s): WBC, RBC, HGB, HCT, PLT, MCV, MCH, MCHC in the last 168 hours.  BNP:  No results for input(s): BNP in the last 168 hours.    Invalid input(s): BNPTRIAGELBLO  CMP:  No results for input(s): GLU, CALCIUM, ALBUMIN, PROT, NA, K, CO2, CL, BUN, CREATININE, ALKPHOS, ALT, AST, BILITOT in the last 168 hours.   Coagulation:   No results for input(s): PT, INR, APTT in the last 168 hours.  LDH:  No results for input(s): LDH in the last 72 hours.  Microbiology:  Microbiology Results (last 7 days)     ** No results found for the last 168 hours. **          BMP: No results for input(s): GLU, NA, K, CL, CO2, BUN, CREATININE, CALCIUM, MG in the last 72 hours.  Cardiac Markers: No results for input(s): CKMB, TROPONINT, MYOGLOBIN in the last 72 hours.  Coagulation: No results for input(s): PT, INR, APTT in the last 72 hours.  Prealbumin: No results for input(s): PREALBUMIN in the last 72 hours.  Microbiology Results (last 7 days)     ** No results found for the last 168 hours. **         I have reviewed all pertinent labs within the past 24 hours.    Diagnostic Results:  CT: No results found in the last 24 hours.  CXR: No results found in the last 24 hours.  U/S: No results found in the last 24 hours.  Echo: I have reviewed all pertinent results/findings within the past 24 hours and my personal findings are:    EKG: I have reviewed all pertinent results/findings within the past 24 hours and my personal findings are:

## 2018-08-20 NOTE — ASSESSMENT & PLAN NOTE
Cr 0.9 ->1 OSH  - Caution with Lasix IV  - Hold Nephrotoxic medications, no Contrast, Keep normotensive and euvolemic    - Closely monitor Cr, BUN  - If does not improve please obtain Urinelytes, UACC, Renal U/s

## 2018-08-20 NOTE — ASSESSMENT & PLAN NOTE
58 year old male presented with acute on chronic decompensated systolic and diastolic non-ischemic CHF. EF 15% on 8/20/18.     First onset of CHF 01/2018, worse a life vest Had single lead ICD implanted 08/11/2018 at [place]. Device interrogation revealed therapy delivered for atrial tachyarrhythmia in the VT/VF detection zone and delivered ATP and shock 8/18/18. No device setting changes at this time.     - Telemetry reviewed which showed no arrythmias.    - LFTs are mildly elevated AST51, . Known COPD with prior smoking history. No PFTs on file.   - Has been unable to tolerate Entresto or Metoprolol secondary to hypotension.   - Currently on Heparin and Lasix gtt.    - Rec starting IV amiodarone for 24 hours with transition to PO. Amiodarone po loading period 400mg BID for 2 weeks then decrease to 200mg daily. In setting of dobutamine initiation, patient is at high risk for developing recurrent tachyarrhthmias.   - Patient see and discussed with Dr. Montemayor. Thank you for this consult. Please call with questions.

## 2018-08-20 NOTE — ASSESSMENT & PLAN NOTE
- Telemetry reviewed which showed no arrythmias.    - LFTs are mildly elevated AST51, . Known COPD with prior smoking history. No PFTs on file.   - Has been unable to tolerate Entresto or Metoprolol secondary to hypotension.   - Currently on Heparin and Lasix gtt.    - Rec starting IV amiodarone for 24 hours with transition to PO. Amiodarone po loading period 400mg BID for 2 weeks then decrease to 200mg daily. In setting of dobutamine initiation, patient is at high risk for developing recurrent tachyarrhthmias.

## 2018-08-20 NOTE — SUBJECTIVE & OBJECTIVE
Past Medical History:   Diagnosis Date    Anticoagulant long-term use     Asthma     CHF (congestive heart failure)     COPD (chronic obstructive pulmonary disease)     Diabetes mellitus     Hypertension        Past Surgical History:   Procedure Laterality Date    BACK SURGERY      titanium rods in neck    TONSILLECTOMY      VASECTOMY         Review of patient's allergies indicates:   Allergen Reactions    Buspirone Palpitations       No current facility-administered medications on file prior to encounter.      Current Outpatient Medications on File Prior to Encounter   Medication Sig    dextrose (GLUTOSE 15 ORAL) Take by mouth daily as needed (<60 BG).    dextrose 50 % in water (DEXTROSE 50%, D50W,) solution Inject 25 g into the vein daily as needed (if cannot take PO and BG <40).    doxycycline (VIBRAMYCIN) 100 MG Cap Take 100 mg by mouth every 12 (twelve) hours.    fluticasone-vilanterol (BREO ELLIPTA) 200-25 mcg/dose DsDv diskus inhaler Inhale 1 puff into the lungs once daily. Controller    furosemide (LASIX) 20 MG tablet Take 20 mg by mouth 2 (two) times daily.    glucagon HCl 1 mg SolR Inject as directed as needed.    insulin NPH (NOVOLIN N) 100 unit/mL injection Inject into the skin 4 (four) times daily before meals and nightly.    metoprolol succinate (TOPROL-XL) 25 MG 24 hr tablet Take 25 mg by mouth 3 (three) times daily.    mupirocin calcium 2% (BACTROBAN) 2 % cream Apply topically 2 (two) times daily.    nicotine (NICODERM CQ) 21 mg/24 hr Place 1 patch onto the skin every 24 hours.    ondansetron (ZOFRAN) 4 MG tablet Take 4 mg by mouth as needed for Nausea.    pantoprazole (PROTONIX) 40 MG tablet Take 40 mg by mouth once daily.    rivaroxaban (XARELTO) 10 mg Tab Take 10 mg by mouth daily with dinner or evening meal.     Family History     Problem Relation (Age of Onset)    Cancer Mother, Brother, Maternal Grandfather, Paternal Grandmother    Diabetes Father    Heart disease Father,  Brother    Stroke Mother        Tobacco Use    Smoking status: Former Smoker     Packs/day: 1.50     Years: 53.00     Pack years: 79.50     Start date: 1968     Last attempt to quit: 8/10/2018     Years since quittin.0    Smokeless tobacco: Former User     Types: Chew     Quit date: 2009    Tobacco comment: pt has nicotine patch on   Substance and Sexual Activity    Alcohol use: No     Frequency: Never    Drug use: No    Sexual activity: Not Currently     Partners: Female     Review of Systems   Constitution: Negative for chills.   HENT: Negative for congestion.    Eyes: Negative for blurred vision.   Cardiovascular: Positive for dyspnea on exertion, irregular heartbeat, leg swelling and palpitations. Negative for chest pain, near-syncope and syncope.   Respiratory: Positive for cough and shortness of breath.    Endocrine: Negative for polyuria.   Skin: Negative for itching and rash.   Musculoskeletal: Negative for back pain.   Gastrointestinal: Negative for abdominal pain, constipation, diarrhea, nausea and vomiting.   Genitourinary: Negative for dysuria.   Neurological: Negative for dizziness, headaches and light-headedness.   Psychiatric/Behavioral: Negative for altered mental status.     Objective:     Vital Signs (Most Recent):  Temp: 97.6 °F (36.4 °C) (18 1305)  Pulse: 110 (18 1305)  Resp: 18 (18 1305)  BP: 104/73 (18 1305)  SpO2: (!) 93 % (18 1330) Vital Signs (24h Range):  Temp:  [97.6 °F (36.4 °C)-98.1 °F (36.7 °C)] 97.6 °F (36.4 °C)  Pulse:  [] 110  Resp:  [17-18] 18  SpO2:  [89 %-96 %] 93 %  BP: ()/(68-86) 104/73       Weight: 73.1 kg (161 lb 2.5 oz)  Body mass index is 25.24 kg/m².    SpO2: (!) 93 %  O2 Device (Oxygen Therapy): room air    Physical Exam   Constitutional: He is oriented to person, place, and time. He appears well-developed and well-nourished.   HENT:   Head: Normocephalic and atraumatic.   Eyes: Conjunctivae are normal. Pupils  are equal, round, and reactive to light.   Neck: Normal range of motion. Neck supple.   Cardiovascular: Normal rate, regular rhythm, S1 normal, S2 normal and normal heart sounds. Exam reveals no gallop and no friction rub.   No murmur heard.  Pulses:       Radial pulses are 2+ on the right side, and 2+ on the left side.   10cm JVD while at 45 degrees   Pulmonary/Chest: Effort normal and breath sounds normal. No respiratory distress. He has no wheezes. He has no rales. He exhibits no tenderness.   Prolonged expiratory phase. Poor air movement.   Abdominal: Soft. Bowel sounds are normal. He exhibits no distension and no mass. There is no tenderness. There is no rebound and no guarding.   Musculoskeletal: He exhibits edema (Trace B/L pedal edema).   Neurological: He is alert and oriented to person, place, and time.   Skin: Skin is warm and dry.   Psychiatric: He has a normal mood and affect.       Significant Labs:   CMP:   Recent Labs   Lab  08/20/18 0046 08/20/18 0450   NA  136  136  137   K  4.8  4.8  5.0   CL  96  96  97   CO2  30*  30*  30*   GLU  132*  132*  131*   BUN  21*  21*  21*   CREATININE  1.1  1.1  1.1   CALCIUM  9.4  9.4  9.2   PROT  6.1  6.1   --    ALBUMIN  3.2*  3.2*   --    BILITOT  1.2*  1.2*   --    ALKPHOS  95  95   --    AST  51*  51*   --    ALT  365*  365*   --    ANIONGAP  10  10  10   ESTGFRAFRICA  >60.0  >60.0  >60.0   EGFRNONAA  >60.0  >60.0  >60.0   , CBC:   Recent Labs   Lab  08/20/18 0046   WBC  8.48  8.48   HGB  12.6*  12.6*   HCT  39.4*  39.4*   PLT  81*  81*   , INR:   Recent Labs   Lab  08/20/18 0450   INR  1.6*   , Lipid Panel   Recent Labs   Lab  08/20/18 0046   CHOL  97*   HDL  30*   LDLCALC  56.4*   TRIG  53   CHOLHDL  30.9    and Troponin   Recent Labs   Lab  08/20/18 0046   TROPONINI  0.033*     8/20/18 TSH- 2.3  8/20/18 - BNP 1860    Significant Imaging: Cardiac Cath: 01/15/18 [facility] completed which noted non-obstructive CAD (no reports in file  of this) at [place] .       CT scan: CT ABDOMEN PELVIS WITHOUT CONTRAST: No results found for this visit on 08/19/18., Echocardiogram:   2D echo with color flow doppler:   Results for orders placed or performed during the hospital encounter of 08/19/18   2D echo with color flow doppler   Result Value Ref Range    EF 15 (A) 55 - 65    Mitral Valve Regurgitation MILD     Diastolic Dysfunction Yes (A)     Est. PA Systolic Pressure 51 (A)     Tricuspid Valve Regurgitation MODERATE (A)    , EKG: Sinus tachycardia, RBBB, PVCs and X-Ray: CXR: X-Ray Chest 1 View (CXR):   Results for orders placed or performed during the hospital encounter of 08/19/18   X-Ray Chest 1 View    Narrative    EXAMINATION:  XR CHEST 1 VIEW    CLINICAL HISTORY:  heart failure;    TECHNIQUE:  Single frontal view of the chest was performed.    COMPARISON:  None    FINDINGS:  Cardiac pacemaker and wire in place.  Heart is at the upper limits for normal in size.  Pulmonary vascularity is not engorged.  There is some patchy increased opacities at both bases atelectasis or developing consolidation.  No pneumothorax seen.      Impression    Increased bibasilar opacification atelectasis or developing consolidation.  Some increased opacity in the right midlung field as well.  Correlation with clinical findings would be helpful.      Electronically signed by: Marquis Garcia MD  Date:    08/20/2018  Time:    08:02

## 2018-08-20 NOTE — HOSPITAL COURSE
"Started on a lasix and heparin gtt. ECHO 8/20/18 showed Severely depressed left ventricular systolic function (EF 15-20%). Right ventricular enlargement with severely depressed systolic function. Restrictive LV filling pattern, indicating markedly elevated LAP (grade 3 diastolic dysfunction).  Right atrial enlargement.  Moderate tricuspid regurgitation. The estimated PA systolic pressure is 51 mmHg.     EP Consulted for antiarrythmic management in setting of AF/AFL and history of VT.       Noted he wore a lifevest since 01/2018 and had one shock delivered. Notes a history of a "fast heart rate". Prior history of cocaine abuse and also possibly methamphethamine.     "

## 2018-08-20 NOTE — ASSESSMENT & PLAN NOTE
-Pt currently smoking 1.5 PPD for the past 50 years  -Pending CXR   -May benefit from Pulmonary evaluation, with his VT possible Amiodarone would be necessary, for clearance  -After counseling, quit 10 days ago, continue to address  -Continue Nicotine patch  will defer this to PCP  -Resume Inhalers Breo, Albuterol,   -Wean off oxygen as tolerated  -IS / AF

## 2018-08-20 NOTE — HPI
Mr. Harley is a 58 yoM, admitted to [facility] with acute decompensated HF and LVAD work up. 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 Thai DC-ICD (08/11/2018), ARI thrombus, COPD with Former smoker 1.5ppd x 50 years (quit 10 days ago), NIDDM type II, Hypertension, HLD, SVT, PE on Xarelto, MARK not on Cpap, GERD, cocaine use, schizophrenia, medication noncompliance. Admitted at [facility] from 8/14/18- 8/19/18 with cardiogenic shock leading to HARPAL, elevated LFTs, recurrent VT/Afib/AFL. Initially p/w for ICD placement, which was uncomplicated and d/c home following day. Day later p/w increase SOB, acute CHF and tachycardiac, his metoprolol and sotalol was increased and again discharged home following day. That evening he started felling weak with chills, presented to ER and round to have HARPAL, elevated LFTs, cardiogenic shock SBP 60-80s. Started on Dobutamine ggt and admitted to ICU, course of his admission dobutamine was weaned off with initiation of metoprolol lead to symptomatic hypotension. Interrogation of his ICD per report noted SVT in 230s. Patient seen by Cardiology and recommend further evaluation at [facility] for LVAD, poor prognosis with NYHA III/IV with significant MCCLAIN/taxing with minimal activity and further leads to SVT with subsequent shocks. Also seen by GI for elevated LFTs, likely d/t passive congestive hepatopathy, recommended to optimize HF.     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 He was first dx of HF at [facility] 01/15/18 presented with AECHF and LLL PNA. Had a LHC completed which noted non-obstructive CAD (no reports in file of this). Follows up with Cardiology Dr. Montana in [place]. Xarelto last dose 08/19/19 0800 for Afib/ AFL. Since ICD placement it has fired x1 two days ago, St[employer] Thai  interrogation on 08/18/18 revealed Mode VVI, HR 40BPM, VT-1 at 166NPM to monitor, VT-2 181BPM with ATP x3 followed by DCCV, VF 230BPM ATP x1 followed by DCCV (VT rate upto 230BPM into VF zone with failure of ATP x1 and subsequent 30j dccv on 8/18/18 at 11:32am)       Work up at [facility]:   RUQ us 8/15/18 consistent with some degree of cirrhosis with portal HtN.   Cr 0.9, ALB 3.1, T bili 1.3, AST//614, CPK 63, Mag 1.9, Digoxcin 0.87, INR 3.9, Troponin 0.04, BNP 1684, Hep C / Hep B negative,   Medications held Digoxin 0.25mg, Sotalol 80mg BID, Lipitor 40mg, Doxazosin 4mg,       EKG: ST with frequent PVCs, , RBBB with , Left axis deviation, inferior lead Q-waves noted,

## 2018-08-20 NOTE — ASSESSMENT & PLAN NOTE
In the setting of congestive hepatopathy, seen by GI OSH.   - Hep B/C non reactive  - Liver u/s consistent with cirrhosis and portal HTN  - Holding statin therapy

## 2018-08-20 NOTE — CONSULTS
[facility]  Cardiac Electrophysiology  Consult Note    Admission Date: 8/19/2018  Code Status: Full Code   Attending Provider: Venessa Villatoro MD  Consulting Provider: Dov Levine MD  Principal Problem:Cardiogenic shock    Inpatient consult to Electrophysiology  Consult performed by: Dov Levine MD  Consult ordered by: Su Meyers PA-C        Subjective:     Chief Complaint:  ICD shocks      HPI:   Mr. Harley is a 58 year old male with a PMH significant for HTN, HLD, PE on Xarelto, MARK, GERD, schizophrenia, ARI thrombus, NICM (EF 10-15%) s/p St. Thai single lead-ICD 8/11/18.    He was recently admitted at [facility] 8/14/18- 8/19/18 for cardiogenic shock. Initially presented for ICD placement and tolerate procedure well, discharged home the next day. He returned with worsening shortness of breath, tachycardia. His metoprolol and sotalol was increased and again discharged home following day. That evening he started felling weak with chills, presented to ER and round to have HARPAL, elevated LFTs, cardiogenic shock SBP 60-80s. Started on Dobutamine ggt and admitted to ICU. He gradually improved, dobutamine was weaned off. Interrogation of his ICD per report noted SVT in 230s. Unable to tolerate metoprolol secondary to hypotension. Patient seen by Cardiology and recommended further evaluation at [facility] for LVAD.    Admitted to [facility] for acute decompensated heart failure and LVAD evaluation.       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 He was first dx of HF at [facility] 01/15/18 presented with AECHF and LLL PNA. Had a LHC completed which noted non-obstructive CAD (no reports in file of this). Follows up with Cardiology Dr. Montana in [place]. Xarelto last dose 08/19/19 0800 for Afib/  AFL. Since ICD placement it has fired x1 two days ago, [employer] Thai interrogation on 08/18/18 revealed Mode VVI, HR 40BPM, VT-1 at 166NPM to monitor, VT-2 181BPM with ATP x3 followed by DCCV, VF 230BPM ATP x1 followed by DCCV (VT rate upto 230BPM into VF zone with failure of ATP x1 and subsequent 30j dccv on 8/18/18 at 11:32am)         Work up at [facility]:   RUQ us 8/15/18 consistent with some degree of cirrhosis with portal HtN.   Cr 0.9, ALB 3.1, T bili 1.3, AST//614, CPK 63, Mag 1.9, Digoxin 0.87, INR 3.9, Troponin 0.04, BNP 1684, Hep C / Hep B negative,   Medications held Digoxin 0.25mg, Sotalol 80mg BID, Lipitor 40mg, Doxazosin 4mg,         EKG: Sinus tachycardia, RBBB, with frequent PVCs, ,         Past Medical History:   Diagnosis Date    Anticoagulant long-term use     Asthma     CHF (congestive heart failure)     COPD (chronic obstructive pulmonary disease)     Diabetes mellitus     Hypertension        Past Surgical History:   Procedure Laterality Date    BACK SURGERY      titanium rods in neck    TONSILLECTOMY      VASECTOMY         Review of patient's allergies indicates:   Allergen Reactions    Buspirone Palpitations       No current facility-administered medications on file prior to encounter.      Current Outpatient Medications on File Prior to Encounter   Medication Sig    dextrose (GLUTOSE 15 ORAL) Take by mouth daily as needed (<60 BG).    dextrose 50 % in water (DEXTROSE 50%, D50W,) solution Inject 25 g into the vein daily as needed (if cannot take PO and BG <40).    doxycycline (VIBRAMYCIN) 100 MG Cap Take 100 mg by mouth every 12 (twelve) hours.    fluticasone-vilanterol (BREO ELLIPTA) 200-25 mcg/dose DsDv diskus inhaler Inhale 1 puff into the lungs once daily. Controller    furosemide (LASIX) 20 MG tablet Take 20 mg by mouth 2 (two) times daily.    glucagon HCl 1 mg SolR Inject as directed as needed.    insulin NPH (NOVOLIN N) 100 unit/mL injection  Inject into the skin 4 (four) times daily before meals and nightly.    metoprolol succinate (TOPROL-XL) 25 MG 24 hr tablet Take 25 mg by mouth 3 (three) times daily.    mupirocin calcium 2% (BACTROBAN) 2 % cream Apply topically 2 (two) times daily.    nicotine (NICODERM CQ) 21 mg/24 hr Place 1 patch onto the skin every 24 hours.    ondansetron (ZOFRAN) 4 MG tablet Take 4 mg by mouth as needed for Nausea.    pantoprazole (PROTONIX) 40 MG tablet Take 40 mg by mouth once daily.    rivaroxaban (XARELTO) 10 mg Tab Take 10 mg by mouth daily with dinner or evening meal.     Family History     Problem Relation (Age of Onset)    Cancer Mother, Brother, Maternal Grandfather, Paternal Grandmother    Diabetes Father    Heart disease Father, Brother    Stroke Mother        Tobacco Use    Smoking status: Former Smoker     Packs/day: 1.50     Years: 53.00     Pack years: 79.50     Start date: 1968     Last attempt to quit: 8/10/2018     Years since quittin.0    Smokeless tobacco: Former User     Types: Chew     Quit date: 2009    Tobacco comment: pt has nicotine patch on   Substance and Sexual Activity    Alcohol use: No     Frequency: Never    Drug use: No    Sexual activity: Not Currently     Partners: Female     Review of Systems   Constitution: Negative for chills.   HENT: Negative for congestion.    Eyes: Negative for blurred vision.   Cardiovascular: Positive for dyspnea on exertion, irregular heartbeat, leg swelling and palpitations. Negative for chest pain, near-syncope and syncope.   Respiratory: Positive for cough and shortness of breath.    Endocrine: Negative for polyuria.   Skin: Negative for itching and rash.   Musculoskeletal: Negative for back pain.   Gastrointestinal: Negative for abdominal pain, constipation, diarrhea, nausea and vomiting.   Genitourinary: Negative for dysuria.   Neurological: Negative for dizziness, headaches and light-headedness.   Psychiatric/Behavioral: Negative for  altered mental status.     Objective:     Vital Signs (Most Recent):  Temp: 97.6 °F (36.4 °C) (08/20/18 1305)  Pulse: 110 (08/20/18 1305)  Resp: 18 (08/20/18 1305)  BP: 104/73 (08/20/18 1305)  SpO2: (!) 93 % (08/20/18 1330) Vital Signs (24h Range):  Temp:  [97.6 °F (36.4 °C)-98.1 °F (36.7 °C)] 97.6 °F (36.4 °C)  Pulse:  [] 110  Resp:  [17-18] 18  SpO2:  [89 %-96 %] 93 %  BP: ()/(68-86) 104/73       Weight: 73.1 kg (161 lb 2.5 oz)  Body mass index is 25.24 kg/m².    SpO2: (!) 93 %  O2 Device (Oxygen Therapy): room air    Physical Exam   Constitutional: He is oriented to person, place, and time. He appears well-developed and well-nourished.   HENT:   Head: Normocephalic and atraumatic.   Eyes: Conjunctivae are normal. Pupils are equal, round, and reactive to light.   Neck: Normal range of motion. Neck supple.   Cardiovascular: Normal rate, regular rhythm, S1 normal, S2 normal and normal heart sounds. Exam reveals no gallop and no friction rub.   No murmur heard.  Pulses:       Radial pulses are 2+ on the right side, and 2+ on the left side.   10cm JVD while at 45 degrees   Pulmonary/Chest: Effort normal and breath sounds normal. No respiratory distress. He has no wheezes. He has no rales. He exhibits no tenderness.   Prolonged expiratory phase. Poor air movement.   Abdominal: Soft. Bowel sounds are normal. He exhibits no distension and no mass. There is no tenderness. There is no rebound and no guarding.   Musculoskeletal: He exhibits edema (Trace B/L pedal edema).   Neurological: He is alert and oriented to person, place, and time.   Skin: Skin is warm and dry.   Psychiatric: He has a normal mood and affect.       Significant Labs:   CMP:   Recent Labs   Lab  08/20/18   0046  08/20/18   0450   NA  136  136  137   K  4.8  4.8  5.0   CL  96  96  97   CO2  30*  30*  30*   GLU  132*  132*  131*   BUN  21*  21*  21*   CREATININE  1.1  1.1  1.1   CALCIUM  9.4  9.4  9.2   PROT  6.1  6.1   --    ALBUMIN   3.2*  3.2*   --    BILITOT  1.2*  1.2*   --    ALKPHOS  95  95   --    AST  51*  51*   --    ALT  365*  365*   --    ANIONGAP  10  10  10   ESTGFRAFRICA  >60.0  >60.0  >60.0   EGFRNONAA  >60.0  >60.0  >60.0   , CBC:   Recent Labs   Lab  08/20/18   0046   WBC  8.48  8.48   HGB  12.6*  12.6*   HCT  39.4*  39.4*   PLT  81*  81*   , INR:   Recent Labs   Lab  08/20/18   0450   INR  1.6*   , Lipid Panel   Recent Labs   Lab  08/20/18   0046   CHOL  97*   HDL  30*   LDLCALC  56.4*   TRIG  53   CHOLHDL  30.9    and Troponin   Recent Labs   Lab  08/20/18   0046   TROPONINI  0.033*     8/20/18 TSH- 2.3  8/20/18 - BNP 1860    Significant Imaging: Cardiac Cath: 01/15/18 [facility] completed which noted non-obstructive CAD (no reports in file of this) at [place] .       CT scan: CT ABDOMEN PELVIS WITHOUT CONTRAST: No results found for this visit on 08/19/18., Echocardiogram:   2D echo with color flow doppler:   Results for orders placed or performed during the hospital encounter of 08/19/18   2D echo with color flow doppler   Result Value Ref Range    EF 15 (A) 55 - 65    Mitral Valve Regurgitation MILD     Diastolic Dysfunction Yes (A)     Est. PA Systolic Pressure 51 (A)     Tricuspid Valve Regurgitation MODERATE (A)    , EKG: Sinus tachycardia, RBBB, PVCs and X-Ray: CXR: X-Ray Chest 1 View (CXR):   Results for orders placed or performed during the hospital encounter of 08/19/18   X-Ray Chest 1 View    Narrative    EXAMINATION:  XR CHEST 1 VIEW    CLINICAL HISTORY:  heart failure;    TECHNIQUE:  Single frontal view of the chest was performed.    COMPARISON:  None    FINDINGS:  Cardiac pacemaker and wire in place.  Heart is at the upper limits for normal in size.  Pulmonary vascularity is not engorged.  There is some patchy increased opacities at both bases atelectasis or developing consolidation.  No pneumothorax seen.      Impression    Increased bibasilar opacification atelectasis or developing consolidation.  Some  increased opacity in the right midlung field as well.  Correlation with clinical findings would be helpful.      Electronically signed by: Marquis Garcia MD  Date:    08/20/2018  Time:    08:02        CHADS2 for A-FIB Stroke Risk  Age?: < 65 years old  CHF history: Yes  HTN history: Yes  Previous Stroke Sx or TIA: No  Vascular Disease History?: No  Diabetes Mellitus History: Yes  Female?: No  [id number] Total Score: 3      Assessment and Plan:     A-fib    - Telemetry reviewed which showed no arrythmias.    - LFTs are mildly elevated AST51, . Known COPD with prior smoking history. No PFTs on file.   - Has been unable to tolerate Entresto or Metoprolol secondary to hypotension.   - Currently on Heparin and Lasix gtt.    - Rec starting IV amiodarone for 24 hours with transition to PO. Amiodarone po loading period 400mg BID for 2 weeks then decrease to 200mg daily. In setting of dobutamine initiation, patient is at high risk for developing recurrent tachyarrhthmias.         NICM (nonischemic cardiomyopathy)    58 year old male presented with acute on chronic decompensated systolic and diastolic non-ischemic CHF. EF 15% on 8/20/18.     First onset of CHF 01/2018, worse a life vest Had single lead ICD implanted 08/11/2018 at [place]. Device interrogation revealed therapy delivered for atrial tachyarrhythmia in the VT/VF detection zone and delivered ATP and shock 8/18/18. No device setting changes at this time.     - Telemetry reviewed which showed no arrythmias.    - LFTs are mildly elevated AST51, . Known COPD with prior smoking history. No PFTs on file.   - Has been unable to tolerate Entresto or Metoprolol secondary to hypotension.   - Currently on Heparin and Lasix gtt.    - Rec starting IV amiodarone for 24 hours with transition to PO. Amiodarone po loading period 400mg BID for 2 weeks then decrease to 200mg daily. In setting of dobutamine initiation, patient is at high risk for developing recurrent  moises.   - Patient see and discussed with Dr. Montemayor. Thank you for this consult. Please call with questions.                 Thank you for your consult. I will sign off. Please contact us if you have any additional questions.    Dov Levine MD  Cardiac Electrophysiology  [facility]

## 2018-08-20 NOTE — PLAN OF CARE
Problem: Patient Care Overview  Goal: Plan of Care Review  Outcome: Revised  Pt free of falls/trauma/injuries.  Denies c/o SOB, CP, or discomfort.  Generalized skin remains CDI; No edema noted.  Pt being diuresed with PO Lasix. Pt received 2mg Magnesium IVPB.   Pt tolerating plan of care.

## 2018-08-20 NOTE — PROGRESS NOTES
Admit Note     Met with patient to assess needs. Patient is a 58 y.o.  male, admitted for cardiogenic shock, HF, MARK and hypertension.    Pt's spouse passes away March 2016.        Patient admitted from home on 8/19/2018 .  At this time, patient presents as alert and oriented x 4, good eye contact, calm and communicative.  At this time, patients caregiver is not in attendance.  Pt does not believe a caregiver will be in attendance due to transportation issues.     Household/Family Systems     Patient resides with patient's son, daughter and son in law, at     [address] [address].    2.5 hours from [facility].  The pt reports his family is in the process of looking for an new home and there may be a new address when pt is discharged.       Support system includes daughter, son in law and son.    Patient does not have dependents that are need of being cared for.     Patients primary caregiver is self.   No land line  Pt's cell:  [phone]    Emergency contacts: Ajay Joshi ( pt does not know how to spell daughters last name)   Daughter, not currently working, receives SSI and per pt may be pregnant.  Pt reports his daughter does not have cell phone, but will use pt's/brothers phone when needed. Pt's daughter does not drive.   Bryan Harley (son, 32 yr old, not currently working, receives SSI due to mental health issue and does not drive)  [phone]   Pt did not provide name of son in law.  Pt reports his son in law does not work (but can) and does not drive.       During admission, patient's caregiver plans to stay at home.  Confirmed patient and patients caregivers do not have access to reliable transportation. The pt and all reported family members do not drive.     Cognitive Status/Learning     Patient reports reading ability as special ed (pt did not graduated with special ed certificate)  and states patient does not have difficulty with reading, comprehension, learning  "and memory. Pt reports he has difficulty with his eyesight and needs glasses, but he can't afford same.  Pt also report hearing loss in both ears,however pt can't afford the cost of hearing aids.  Pt reports he is not able to write well.     Patient reports patient learns best by one on one with oral instructions.     Needed: No.   Highest education level: special ed.    Vocation/Disability   .  Working for Income: No  If no, reason not working: Disability  Patient used to work for an oil company, however pt has received disability since 1996.   Extended family income is limited.     Adherence     Patient reports a medium/low level of adherence to patients health care regimen. Pt reports he has missed some medical appointments due to a lack of transportation and having "to many appointments in one day".  Pt reports he tries to follow his diet, but it's difficult.  Pt reports possible non compliance with medications, however the pt reports his medications have been changing so rapidly especially over the last two weeks he is not sure what medications he's suppose to be taking.   Adherence counseling and education provided. Patient verbalizes understanding.    Substance Use    Patient reports the following substance usage.    Tobacco: Pt reports he quit smoking 10 days ago.  The pt used to smoke 1.5 ppd for the last 50 years.  Pt reports he used to chew tobacco, but he has not done same in a long time.  Pt reports he is on a nicotine patch.   Alcohol: none, patient denies any use.  Illicit Drugs/Non-prescribed Medications: Pt reports use of crack cocaine (smoke) pt reports has not used any illicit drugs for the last four months.  The pt reports his crack/cocaine use was a daily habit due to possible mental health issues.  Pt reports he stopped drug use on his own.     Patient states clear understanding of the potential impact of substance use.  Substance abstinence/cessation counseling, education and " resources provided and reviewed.     Services Utilizing/ADLS    Infusion Service: Prior to admission, patient utilizing? no  Home Health: Prior to admission, patient utilizing? no  DME: Prior to admission, no Pt reports he would like a hospital bed and scooter for home.  Worker explained process of insurance covering same.   Pulmonary/Cardiac Rehab: Prior to admission, no  Dialysis:  Prior to admission, no  Transplant Specialty Pharmacy:  Prior to admission, no.    Prior to admission, patient reports patient was somewhat  independent with ADLS. Pt reports SOB with self care and daughter would assist as needed. Pt and extended family do not drive.  Patient reports patient is not able to care for self at this time due to compromised medical condition (as documented in medical record) and physical weakness..  Patient indicates a willingness to care for self once medically cleared to do so.    Insurance/Medications    Insured by   Payor/Plan Subscr  Sex Relation Sub. Ins. ID Effective Group Num   1. V[employer]* DAYAN SEGURA 1960 Male  [id number]                                   [address] [facility] [address]   2. [place]* DAYAN SEGURA 1960 Male  [id number]* 1999                                    [address] [address]      Primary Insurance (for UNOS reporting): Public Insurance - Medicare FFS (Fee For Service)  Secondary Insurance (for UNOS reporting): Public Insurance - Medicaid-limited QMB only    Patient reports patient is able to obtain and afford medications at this time and at time of discharge.    Living Will/Healthcare Power of     Patient states patient does not have a LW and/or HCPA.   provided education regarding LW and HCPA and the completion of forms.    Coping/Mental Health    Patient is coping adequately with the aid of  family members and self.   Patient indicates mental health difficulties.   Pt reports he was dx with schizophrenia when he was 23  "years old.  Pt reports he has not been hospitalized due to schizophrenia in 13-14 years and the pt has not heard any voices for the last two years.  Pt reports he does not take any medication and feels " the schizophrenia has gone away".   The pt reports a history of anxiety and was taking Xanax for same.  Pt reports the Xanax was no longer being prescribed so he thinks he turned to crack to provide anxiety relief.  Pt reports he is currently  experiencing less anxiety and feels he is learning new ways to control anxious feelings.    Pt is currently unclear how supportive his daughter can be if advanced treatment for his heart is needed.  For any type of assessments his daughter would need transportation support.  Worker provided general support.      Discharge Planning    At time of discharge, patient plans to return to patient's current home or new home  under the care of self and daughter.  Patient reports his insurance coverage provides transport home,however transport company needs TWO days notice.   Pt does not have another ride home.   Per rounds today, expected discharge date has not been medically determined at this time. Patient and patients caregiver  verbalize understanding and are involved in treatment planning and discharge process.    Additional Concerns    Patient is being followed for needs, education, resources, information, emotional support, supportive counseling, and for supportive and skilled discharge plan of care.  providing ongoing psychosocial support, education, resources and d/c planning as needed.  SW remains available. Patient denies additional needs and/or concerns at this time. Patient verbalizes understanding and agreement with information reviewed, social work availability, and how to access available resources as needed.  "

## 2018-08-20 NOTE — ASSESSMENT & PLAN NOTE
Mr. Harley is a 58 yoM, admitted to [facility] service with acute decompensated HF and LVAD work up. 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 DC-ICD (08/11/2018), ARI thrombus, COPD with Former smoker 1.5ppd x 50 years (quit 10 days ago), NIDDM type II, Hypertension, HLD, SVT, PE on Xarelto, MARK not on Cpap, GERD, cocaine use, schizophrenia, medication noncompliance. Admitted at [facility] from 8/14/18- 8/19/18 with cardiogenic shock leading to HARPAL, elevated LFTs, recurrent VT/Afib/AFL. Who is a transfer for LVAD evaluation from [facility] with cardiogenic shock, ongoing SVT/VT with ICD shocks and difficult to wean off dobutamine.    - Hx of [facility] 2017 per patient with unremarkable coronary   - EKG: ST with frequent PVCs, , RBBB with , Left axis deviation, inferior lead Q-waves noted,     - Admit to [facility] service,   - Complete blood work with CBC, CMP, INR, BNP  - Follow up CXR  - Will have him NPO incase further work up planned for today   - Continue Lasix 20mg BID IV, closely monitor hemodynamics  - Will need C / C further evaluate for LVAD  - Toprol XL 25mg Qday ( OSH TID but developed hypotension on this)   - Consider RIJ placement for CVP and calculate hemodynamics   - Ordered completed Echo with CFD / bubble study   - Fluid restriction at 1000 cc with strict I/Os and daily STANDING weights  - Maintain on telemetry and daily EKGs   - Up to date risk stratification : TSH, Lipids, HbA1c with optimization of risk factors is necessary  - Check Electrolytes, keep Mag >2 & K+ >4  - SCDs, TEDs, Nursing communication to elevated LE   - Ambulate as tolerated, PT / OT ordered

## 2018-08-20 NOTE — ASSESSMENT & PLAN NOTE
Hypotensive at OSH, Holding Enteresto for now  - initial vitals wnl, continue to monitor  - low dose Toprol XL titrate up as tolerated  - Will initiate ACEi once BMP wnl and renal function stable   - Low salt DASH diet

## 2018-08-21 PROBLEM — I50.9 ACUTE DECOMPENSATED HEART FAILURE: Status: RESOLVED | Noted: 2018-08-19 | Resolved: 2018-08-21

## 2018-08-21 PROBLEM — I10 HYPERTENSION: Status: RESOLVED | Noted: 2018-08-20 | Resolved: 2018-08-21

## 2018-08-21 LAB
ANION GAP SERPL CALC-SCNC: 11 MMOL/L
APTT BLDCRRT: 34 SEC
APTT BLDCRRT: 45.7 SEC
APTT BLDCRRT: 45.8 SEC
APTT BLDCRRT: >150 SEC
BASOPHILS # BLD AUTO: 0.03 K/UL
BASOPHILS NFR BLD: 0.4 %
BUN SERPL-MCNC: 25 MG/DL
CALCIUM SERPL-MCNC: 9.4 MG/DL
CHLORIDE SERPL-SCNC: 89 MMOL/L
CO2 SERPL-SCNC: 38 MMOL/L
CREAT SERPL-MCNC: 1.2 MG/DL
DIFFERENTIAL METHOD: ABNORMAL
EOSINOPHIL # BLD AUTO: 0.1 K/UL
EOSINOPHIL NFR BLD: 1.6 %
ERYTHROCYTE [DISTWIDTH] IN BLOOD BY AUTOMATED COUNT: 16.7 %
EST. GFR  (AFRICAN AMERICAN): >60 ML/MIN/1.73 M^2
EST. GFR  (NON AFRICAN AMERICAN): >60 ML/MIN/1.73 M^2
GLUCOSE SERPL-MCNC: 183 MG/DL
HCT VFR BLD AUTO: 37.9 %
HGB BLD-MCNC: 12.7 G/DL
IMM GRANULOCYTES # BLD AUTO: 0.03 K/UL
IMM GRANULOCYTES NFR BLD AUTO: 0.4 %
INR PPP: 1.2
LYMPHOCYTES # BLD AUTO: 2 K/UL
LYMPHOCYTES NFR BLD: 27.7 %
MAGNESIUM SERPL-MCNC: 1.7 MG/DL
MCH RBC QN AUTO: 29.5 PG
MCHC RBC AUTO-ENTMCNC: 33.5 G/DL
MCV RBC AUTO: 88 FL
MONOCYTES # BLD AUTO: 0.6 K/UL
MONOCYTES NFR BLD: 7.7 %
NEUTROPHILS # BLD AUTO: 4.5 K/UL
NEUTROPHILS NFR BLD: 62.2 %
NRBC BLD-RTO: 0 /100 WBC
PLATELET # BLD AUTO: 90 K/UL
PMV BLD AUTO: 10.1 FL
POCT GLUCOSE: 159 MG/DL (ref 70–110)
POCT GLUCOSE: 180 MG/DL (ref 70–110)
POCT GLUCOSE: 292 MG/DL (ref 70–110)
POTASSIUM SERPL-SCNC: 3.7 MMOL/L
POTASSIUM SERPL-SCNC: 4.2 MMOL/L
PROTHROMBIN TIME: 12.3 SEC
RBC # BLD AUTO: 4.31 M/UL
SODIUM SERPL-SCNC: 138 MMOL/L
WBC # BLD AUTO: 7.3 K/UL

## 2018-08-21 PROCEDURE — 97165 OT EVAL LOW COMPLEX 30 MIN: CPT

## 2018-08-21 PROCEDURE — 97161 PT EVAL LOW COMPLEX 20 MIN: CPT

## 2018-08-21 PROCEDURE — 85730 THROMBOPLASTIN TIME PARTIAL: CPT

## 2018-08-21 PROCEDURE — 20600001 HC STEP DOWN PRIVATE ROOM

## 2018-08-21 PROCEDURE — 63600175 PHARM REV CODE 636 W HCPCS: Performed by: NURSE PRACTITIONER

## 2018-08-21 PROCEDURE — 36415 COLL VENOUS BLD VENIPUNCTURE: CPT

## 2018-08-21 PROCEDURE — 25000003 PHARM REV CODE 250: Performed by: STUDENT IN AN ORGANIZED HEALTH CARE EDUCATION/TRAINING PROGRAM

## 2018-08-21 PROCEDURE — 99233 SBSQ HOSP IP/OBS HIGH 50: CPT | Mod: ,,, | Performed by: INTERNAL MEDICINE

## 2018-08-21 PROCEDURE — 85610 PROTHROMBIN TIME: CPT

## 2018-08-21 PROCEDURE — 85730 THROMBOPLASTIN TIME PARTIAL: CPT | Mod: 91

## 2018-08-21 PROCEDURE — S4991 NICOTINE PATCH NONLEGEND: HCPCS | Performed by: STUDENT IN AN ORGANIZED HEALTH CARE EDUCATION/TRAINING PROGRAM

## 2018-08-21 PROCEDURE — 25000003 PHARM REV CODE 250: Performed by: PHYSICIAN ASSISTANT

## 2018-08-21 PROCEDURE — 83735 ASSAY OF MAGNESIUM: CPT

## 2018-08-21 PROCEDURE — 63600175 PHARM REV CODE 636 W HCPCS: Performed by: PHYSICIAN ASSISTANT

## 2018-08-21 PROCEDURE — 80048 BASIC METABOLIC PNL TOTAL CA: CPT

## 2018-08-21 PROCEDURE — 84132 ASSAY OF SERUM POTASSIUM: CPT

## 2018-08-21 PROCEDURE — 25000003 PHARM REV CODE 250: Performed by: NURSE PRACTITIONER

## 2018-08-21 PROCEDURE — 85025 COMPLETE CBC W/AUTO DIFF WBC: CPT

## 2018-08-21 RX ORDER — LISINOPRIL 2.5 MG/1
2.5 TABLET ORAL 2 TIMES DAILY
Status: DISCONTINUED | OUTPATIENT
Start: 2018-08-21 | End: 2018-08-23

## 2018-08-21 RX ORDER — POTASSIUM CHLORIDE 20 MEQ/1
40 TABLET, EXTENDED RELEASE ORAL DAILY
Status: DISCONTINUED | OUTPATIENT
Start: 2018-08-21 | End: 2018-08-23

## 2018-08-21 RX ORDER — AMIODARONE HYDROCHLORIDE 200 MG/1
400 TABLET ORAL 2 TIMES DAILY
Status: DISCONTINUED | OUTPATIENT
Start: 2018-08-21 | End: 2018-08-29 | Stop reason: HOSPADM

## 2018-08-21 RX ORDER — LANOLIN ALCOHOL/MO/W.PET/CERES
400 CREAM (GRAM) TOPICAL 2 TIMES DAILY
Status: DISCONTINUED | OUTPATIENT
Start: 2018-08-21 | End: 2018-08-29 | Stop reason: HOSPADM

## 2018-08-21 RX ORDER — SPIRONOLACTONE 25 MG/1
25 TABLET ORAL DAILY
Status: DISCONTINUED | OUTPATIENT
Start: 2018-08-22 | End: 2018-08-23

## 2018-08-21 RX ADMIN — POLYETHYLENE GLYCOL 3350 17 G: 17 POWDER, FOR SOLUTION ORAL at 08:08

## 2018-08-21 RX ADMIN — LISINOPRIL 2.5 MG: 2.5 TABLET ORAL at 08:08

## 2018-08-21 RX ADMIN — FLUTICASONE FUROATE AND VILANTEROL TRIFENATATE 1 PUFF: 200; 25 POWDER RESPIRATORY (INHALATION) at 08:08

## 2018-08-21 RX ADMIN — AMIODARONE HYDROCHLORIDE 0.5 MG/MIN: 1.8 INJECTION, SOLUTION INTRAVENOUS at 02:08

## 2018-08-21 RX ADMIN — NICOTINE 1 PATCH: 21 PATCH, EXTENDED RELEASE TRANSDERMAL at 08:08

## 2018-08-21 RX ADMIN — HEPARIN SODIUM AND DEXTROSE 16 UNITS/KG/HR: 10000; 5 INJECTION INTRAVENOUS at 04:08

## 2018-08-21 RX ADMIN — MAGNESIUM OXIDE TAB 400 MG (241.3 MG ELEMENTAL MG) 400 MG: 400 (241.3 MG) TAB at 08:08

## 2018-08-21 RX ADMIN — POTASSIUM CHLORIDE 40 MEQ: 1500 TABLET, EXTENDED RELEASE ORAL at 08:08

## 2018-08-21 RX ADMIN — HEPARIN SODIUM AND DEXTROSE 16 UNITS/KG/HR: 10000; 5 INJECTION INTRAVENOUS at 10:08

## 2018-08-21 RX ADMIN — INSULIN ASPART 1 UNITS: 100 INJECTION, SOLUTION INTRAVENOUS; SUBCUTANEOUS at 08:08

## 2018-08-21 RX ADMIN — FUROSEMIDE 10 MG/HR: 10 INJECTION, SOLUTION INTRAMUSCULAR; INTRAVENOUS at 07:08

## 2018-08-21 RX ADMIN — AMIODARONE HYDROCHLORIDE 400 MG: 200 TABLET ORAL at 08:08

## 2018-08-21 RX ADMIN — PANTOPRAZOLE SODIUM 40 MG: 40 TABLET, DELAYED RELEASE ORAL at 08:08

## 2018-08-21 NOTE — ASSESSMENT & PLAN NOTE
-Diuresed extremely well overnight. Still hypervolemic on exam. Continue Lasix 10mg/hr.  -Will likely eventually need RHC / LHC  -Holding off on inotropes in light of frequent ICD shocks. Appreciate EP help as below.   -Ambulate as tolerated, PT / OT

## 2018-08-21 NOTE — PLAN OF CARE
Problem: Patient Care Overview  Goal: Plan of Care Review  Outcome: Ongoing (interventions implemented as appropriate)  Plan of care reviewed with patient. Patient remains free from injury. No acute events noted at this time. Heparin, lasix, and amio gtts continued. Good urine output noted. Plan for amio gtt to stop at 2100 today and begin PO amio. Patient states understanding of plan of care. VS stable. Call bell in reach. Bed in lowest position. Will continue to monitor.

## 2018-08-21 NOTE — PLAN OF CARE
Problem: Occupational Therapy Goal  Goal: Occupational Therapy Goal  Outcome: Outcome(s) achieved Date Met: 08/21/18  Initial eval completed   Pt is currently performing ADLs, functional mobility & t/fs at baseline and displays age-appropriate strength, endurance & balance. OT services are not recommended at this time and patient is safe to D/C home.      Opal Eden, OTR/L  [phone]  8/21/2018

## 2018-08-21 NOTE — PT/OT/SLP EVAL
"Physical Therapy Evaluation/Discharge     Patient Name:  Colton Harley   MRN:  [id number]     Pot LVAD workup    Recommendations:     Discharge Recommendations:  home   Discharge Equipment Recommendations: none   Barriers to discharge: None    Assessment:     Colton Harley is a 58 y.o. male admitted with a medical diagnosis of Cardiogenic shock.  He presents with the following impairments/functional limitations:  impaired endurance, impaired cardiopulmonary response to activity, gait instability. PT evaluation complete and no goals established. Pt is functioning at high level and does not required acute care physical therapy services. Education provided to ambulate in hallway 3x/day with RN in order to maintain strength and endurance. Pt verbalized understanding. Please re-consult if functional mobility changes. Anticipate d/c to home; no needs.     Rehab Prognosis:  good;     Recent Surgery: * No surgery found *      Plan:     D/c from acute PT 2nd to pt safe with functional mobility     · Plan of Care Expires:      Plan of Care Reviewed with: patient    Subjective     Communicated with RN prior to session.  Patient found in bed upon PT entry to room, agreeable to evaluation.      Chief Complaint: none reported   Patient comments/goals: to get better  Pain/Comfort:  · Pain Rating 1: 0/10  · Pain Rating Post-Intervention 1: 0/10    Patients cultural, spiritual, Tenriism conflicts given the current situation: none reported     Living Environment:  Pt lives with daughter, son, and son-in-law in a trailer with 5 OLIVIER to enter and B HR. Pt trying to transition from trailer to apt when possible.  Prior to admission, pt primarily used WC (family propels) for functional mobility 2nd to impaired endurance and SOB. Pt ambulated with indep on "good days". Pt mod indep with ADL's  Patient has the following equipment: wheelchair(2nd to impaired endurance and SOB).  DME owned (not currently used): none.  Upon discharge, " patient will have assistance from family.    Objective:     Patient found with: telemetry, peripheral IV, oxygen     General Precautions: Standard, fall   Orthopedic Precautions:N/A   Braces: N/A     Exams:  · Cognitive Exam:   · AAOx4  · Follows multistep commands  · Communication clear/fluent   · RLE ROM: WFL  · RLE Strength: WFL  · LLE ROM: WFL  · LLE Strength: WFL    Functional Mobility:  · Bed Mobility:     · Scooting: independence to EOB  · Supine to Sit: independence  · Transfers:     · Sit to Stand:  independence with no AD from EOB  · Gait: ~250ft with Supervision  · No LOB with horizontal head turns, change of speed or change in direction  · No SOB  · Pt's stability increased throughout ambulation     AM-PAC 6 CLICK MOBILITY  Total Score:24       Therapeutic Activities and Exercises:  Educated pt on PT role/POC  Educated pt on importance of OOB activity and daily ambulation  Educate pt on defibrillator precautions   Pt verbalized understanding    T/f to chair to increase tolerance to OOB activity and to create optimal positioning for lung expansion     Patient left up in chair with all lines intact, call button in reach and RN notified.    GOALS:   Multidisciplinary Problems     Physical Therapy Goals     Not on file          Multidisciplinary Problems (Resolved)        Problem: Physical Therapy Goal    Goal Priority Disciplines Outcome Goal Variances Interventions   Physical Therapy Goal   (Resolved)     PT, PT/OT Outcome(s) achieved                     History:     Past Medical History:   Diagnosis Date    Anticoagulant long-term use     Asthma     CHF (congestive heart failure)     COPD (chronic obstructive pulmonary disease)     Diabetes mellitus     Hypertension        Past Surgical History:   Procedure Laterality Date    BACK SURGERY      titanium rods in neck    TONSILLECTOMY      VASECTOMY         Time Tracking:     PT Received On: 08/21/18  PT Start Time: 0820     PT Stop Time: 0834  PT  Total Time (min): 14 min     Billable Minutes: Evaluation 14      Mary Ortega PT, DPT  8/21/2018  [phone]

## 2018-08-21 NOTE — PLAN OF CARE
Problem: Physical Therapy Goal  Goal: Physical Therapy Goal  Outcome: Outcome(s) achieved Date Met: 08/21/18  Eval completed. Pt safe with functional mobility.

## 2018-08-21 NOTE — ASSESSMENT & PLAN NOTE
- In the setting of congestive hepatopathy, seen by GI OSH.   - Hep B/C non reactive.  - Liver u/s consistent with cirrhosis and portal HTN.  - Holding statin therapy.

## 2018-08-21 NOTE — PT/OT/SLP EVAL
Occupational Therapy   Evaluation and Discharge Note    Name: Colton Harley  MRN: [id number]  Admitting Diagnosis:  Cardiogenic shock      Recommendations:     Discharge Recommendations: home  Discharge Equipment Recommendations:  none  Barriers to discharge:  None    History:     Occupational Profile:  Living Environment: Pt reports he lives with his daughter, son and son-in-law in a trailer with 5 OLIVIER and B HRs. Pt has a tub/shower combo with no DME. Pt reports he is trying to move into an apartment sometime within the next week. Pt does not wear oxygen at home and reports he drives occasionally.   Previous level of function: PTA, pt was (I) with ADLs and mod (I) for functional mobility. Pt reports he uses w/c for household/community mobility due to impaired endurance and SOB.  Pt reports he is able to ambulate short distances using no AD but prefers to use w/c.   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   Equipment Owned:  wheelchair  Assistance upon Discharge: Pt reports his family members are able to assist upon d/c.     Past Medical History:   Diagnosis Date    Anticoagulant long-term use     Asthma     CHF (congestive heart failure)     COPD (chronic obstructive pulmonary disease)     Diabetes mellitus     Hypertension        Past Surgical History:   Procedure Laterality Date    BACK SURGERY      titanium rods in neck    TONSILLECTOMY      VASECTOMY         Subjective     Chief Complaint: SOB   Patient/Family stated goals: to have oxygen when he goes home.   Communicated with: RN prior to session. Pt found supine in bed upon OT entry. Pt agreeable to therapy session.     Pain/Comfort:  · Pain Rating 1: 0/10  · Pain Rating Post-Intervention 1: 0/10    Patients cultural, spiritual, Uatsdin conflicts given the current situation: none stated     Objective:     Patient found with: telemetry, peripheral IV, oxygen    General  Precautions: Standard, fall   Orthopedic Precautions:N/A   Braces: N/A     Occupational Performance:    Bed Mobility:    · Patient completed Rolling/Turning to Left with  independence  · Patient completed Supine to Sit with independence    Functional Mobility/Transfers:  · Patient completed Sit <> Stand Transfer with independence  with  no assistive device   · Functional Mobility: Pt engaged in functional mobility simulating household/community mobility with (S) using No AD and while on 2L of oxygen.     Activities of Daily Living:  · Upper Body Dressing: independence    · Lower Body Dressing: independence       Cognitive/Visual Perceptual:  Cognitive/Psychosocial Skills:     -       Oriented to: Person, Place, Time and Situation   -       Follows Commands/attention:Follows multistep  commands  -       Communication: clear/fluent  -       Memory: No Deficits noted  -       Safety awareness/insight to disability: intact   -       Mood/Affect/Coping skills/emotional control: Appropriate to situation  Visual/Perceptual:      -Intact      Physical Exam:  Balance:    - Static sit: (I)   -  Dynamic sit: (I)   - Static standing: (I)   - Dynamic Standing: (S)      Postural examination/scapula alignment:    -       No postural abnormalities identified  Skin integrity: Visible skin intact and Dry  Sensation:    -       Intact BUEs  Dominant hand:    -       R  Upper Extremity Range of Motion:     -       Right Upper Extremity: WFL    -       Left Upper Extremity: WFL      Upper Extremity Strength:    -       Right Upper Extremity: WFL  -       Left Upper Extremity: WFL     Strength:    -       Right Upper Extremity: WFL    -       Left Upper Extremity: WFL     Gross motor coordination:   WFL for B UEs    Patient left up in chair with all lines intact, call button in reach and RN notified    [facility] 6 Click:  AMPA Total Score: 24    Treatment & Education:  Pt educated by therapist on:   - Pt educated on role of OT, POC, and  "goals for therapy.   - Educated pt on being appropriate to transfer with nsg and PCT x 1 person assist   - Time provided for therapeutic counseling and discussion of health disposition.   - Importance of OOB ax's with staff member assistance and sitting OOB majority of day.   - Pt verbalized understanding. Pt expressed no further concerns/questions.  - whiteboard updated   Education:    Assessment:     Colton Harley is a 58 y.o. male with a medical diagnosis of Cardiogenic shock. At this time, patient is functioning at their prior level of function and does not require further acute OT services.     Clinical Decision Makin.  OT Low:  "Pt evaluation falls under low complexity for evaluation coding due to performance deficits noted in 1-3 areas as stated above and 0 co-morbities affecting current functional status. Data obtained from problem focused assessments. No modifications or assistance was required for completion of evaluation. Only brief occupational profile and history review completed."     Plan:     During this hospitalization, patient does not require further acute OT services.  Please re-consult if situation changes.    · Plan of Care Reviewed with: patient    This Plan of care has been discussed with the patient who was involved in its development and understands and is in agreement with the identified goals and treatment plan    GOALS:   Multidisciplinary Problems     Occupational Therapy Goals     Not on file          Multidisciplinary Problems (Resolved)        Problem: Occupational Therapy Goal    Goal Priority Disciplines Outcome Interventions   Occupational Therapy Goal   (Resolved)     OT, PT/OT Outcome(s) achieved                    Time Tracking:     OT Date of Treatment: 18  OT Start Time: 819  OT Stop Time: 833  OT Total Time (min): 14 min    Billable Minutes:Evaluation 14    Opal Eden OT  2018    "

## 2018-08-21 NOTE — PLAN OF CARE
Problem: Patient Care Overview  Goal: Plan of Care Review  Outcome: Ongoing (interventions implemented as appropriate)  Pt remains free of falls/trauma/injuries. No complaints of SOB/CP? Or discomfort. Pt being diuresed with IV Lasix at 10mg/hr; diuresing well. Amio drip at 0.5 mg. Heparin drip at 16 units with a dosing weight of 73.1; last aPTT 34.. Advanced options for a work up for a LVAD. Pt tolerating care, will continue to monitor.

## 2018-08-21 NOTE — SUBJECTIVE & OBJECTIVE
Interval History: Feels much better this am.     Continuous Infusions:   amiodarone in dextrose 5% 0.5 mg/min (08/21/18 0224)    furosemide (LASIX) 2 mg/mL infusion (non-titrating) 10 mg/hr (08/20/18 2218)    heparin (porcine) in D5W 16 Units/kg/hr (08/21/18 0410)     Scheduled Meds:   fluticasone-vilanterol  1 puff Inhalation Daily    magnesium oxide  400 mg Oral Daily    nicotine  1 patch Transdermal Q24H    pantoprazole  40 mg Oral Daily    polyethylene glycol  17 g Oral Daily    potassium chloride  40 mEq Oral Daily    sodium chloride 0.9%  3 mL Intravenous Q8H     PRN Meds:acetaminophen, albuterol, dextrose 50%, glucagon (human recombinant), heparin (PORCINE), heparin (PORCINE), insulin aspart U-100, ondansetron    Review of patient's allergies indicates:   Allergen Reactions    Buspirone Palpitations     Objective:     Vital Signs (Most Recent):  Temp: 98.5 °F (36.9 °C) (08/21/18 0801)  Pulse: 100 (08/21/18 0852)  Resp: 18 (08/21/18 0852)  BP: 102/67 (08/21/18 0801)  SpO2: 97 % (08/21/18 0801) Vital Signs (24h Range):  Temp:  [96 °F (35.6 °C)-98.5 °F (36.9 °C)] 98.5 °F (36.9 °C)  Pulse:  [] 100  Resp:  [18-20] 18  SpO2:  [89 %-97 %] 97 %  BP: ()/(65-75) 102/67     Patient Vitals for the past 72 hrs (Last 3 readings):   Weight   08/21/18 0712 68 kg (149 lb 14.6 oz)   08/19/18 2350 73.1 kg (161 lb 2.5 oz)     Body mass index is 23.48 kg/m².      Intake/Output Summary (Last 24 hours) at 8/21/2018 0929  Last data filed at 8/21/2018 0600  Gross per 24 hour   Intake 1527.62 ml   Output 5955 ml   Net -4427.38 ml            Telemetry: nsr    Physical Exam   Constitutional: He is oriented to person, place, and time. He appears well-developed and well-nourished.   HENT:   Head: Normocephalic.   Eyes: Pupils are equal, round, and reactive to light.   Neck: Normal range of motion. Neck supple. JVD present.   Cardiovascular: Normal rate and regular rhythm.   Murmur heard.  Pulmonary/Chest: Effort  normal and breath sounds normal.   Abdominal: Soft. Bowel sounds are normal.   Musculoskeletal: Normal range of motion.   Neurological: He is alert and oriented to person, place, and time.   Skin: Skin is warm and dry.   Psychiatric: He has a normal mood and affect. His behavior is normal.   Nursing note and vitals reviewed.    Significant Labs:  CBC:  Recent Labs   Lab  08/20/18 0046  08/21/18 0316   WBC  8.48  8.48  7.30   RBC  4.43*  4.43*  4.31*   HGB  12.6*  12.6*  12.7*   HCT  39.4*  39.4*  37.9*   PLT  81*  81*  90*   MCV  89  89  88   MCH  28.4  28.4  29.5   MCHC  32.0  32.0  33.5     BNP:  Recent Labs   Lab  08/20/18 0046   BNP  1,860*     CMP:  Recent Labs   Lab  08/20/18   0046  08/20/18 0450 08/20/18 1757 08/21/18 0316   GLU  132*  132*  131*  212*  183*   CALCIUM  9.4  9.4  9.2  9.3  9.4   ALBUMIN  3.2*  3.2*   --    --    --    PROT  6.1  6.1   --    --    --    NA  136  136  137  138  138   K  4.8  4.8  5.0  4.1  3.7   CO2  30*  30*  30*  37*  38*   CL  96  96  97  91*  89*   BUN  21*  21*  21*  23*  25*   CREATININE  1.1  1.1  1.1  1.3  1.2   ALKPHOS  95  95   --    --    --    ALT  365*  365*   --    --    --    AST  51*  51*   --    --    --    BILITOT  1.2*  1.2*   --    --    --       Coagulation:   Recent Labs   Lab  08/20/18   0450  08/20/18   0957  08/20/18 1757 08/21/18 0316   INR  1.6*   --    --   1.2   APTT   --   31.6  33.1*  34.0*     LDH:  No results for input(s): LDH in the last 72 hours.  Microbiology:  Microbiology Results (last 7 days)     ** No results found for the last 168 hours. **        I have reviewed all pertinent labs within the past 24 hours.    Estimated Creatinine Clearance: 63.5 mL/min (based on SCr of 1.2 mg/dL).    Diagnostic Results:  I have reviewed all pertinent imaging results/findings within the past 24 hours.

## 2018-08-21 NOTE — ASSESSMENT & PLAN NOTE
- Cr stable.  - Will monitor with Lasix IV  - Hold Nephrotoxic medications, no Contrast, Keep normotensive and euvolemic

## 2018-08-21 NOTE — PROGRESS NOTES
[facility]  Heart Transplant  Progress Note    Patient Name: Colton Harley  MRN: [id number]  Admission Date: 8/19/2018  Hospital Length of Stay: 2 days  Attending Physician: Venessa Villatoro MD  Primary Care Provider: Primary Doctor No  Principal Problem:Cardiogenic shock    Subjective:     Interval History: Feels much better this am.     Continuous Infusions:   amiodarone in dextrose 5% 0.5 mg/min (08/21/18 0224)    furosemide (LASIX) 2 mg/mL infusion (non-titrating) 10 mg/hr (08/20/18 2218)    heparin (porcine) in D5W 16 Units/kg/hr (08/21/18 0410)     Scheduled Meds:   fluticasone-vilanterol  1 puff Inhalation Daily    magnesium oxide  400 mg Oral Daily    nicotine  1 patch Transdermal Q24H    pantoprazole  40 mg Oral Daily    polyethylene glycol  17 g Oral Daily    potassium chloride  40 mEq Oral Daily    sodium chloride 0.9%  3 mL Intravenous Q8H     PRN Meds:acetaminophen, albuterol, dextrose 50%, glucagon (human recombinant), heparin (PORCINE), heparin (PORCINE), insulin aspart U-100, ondansetron    Review of patient's allergies indicates:   Allergen Reactions    Buspirone Palpitations     Objective:     Vital Signs (Most Recent):  Temp: 98.5 °F (36.9 °C) (08/21/18 0801)  Pulse: 100 (08/21/18 0852)  Resp: 18 (08/21/18 0852)  BP: 102/67 (08/21/18 0801)  SpO2: 97 % (08/21/18 0801) Vital Signs (24h Range):  Temp:  [96 °F (35.6 °C)-98.5 °F (36.9 °C)] 98.5 °F (36.9 °C)  Pulse:  [] 100  Resp:  [18-20] 18  SpO2:  [89 %-97 %] 97 %  BP: ()/(65-75) 102/67     Patient Vitals for the past 72 hrs (Last 3 readings):   Weight   08/21/18 0712 68 kg (149 lb 14.6 oz)   08/19/18 2350 73.1 kg (161 lb 2.5 oz)     Body mass index is 23.48 kg/m².      Intake/Output Summary (Last 24 hours) at 8/21/2018 0929  Last data filed at 8/21/2018 0600  Gross per 24 hour   Intake 1527.62 ml   Output 5955 ml   Net -4427.38 ml            Telemetry: nsr    Physical Exam   Constitutional: He is oriented to  person, place, and time. He appears well-developed and well-nourished.   HENT:   Head: Normocephalic.   Eyes: Pupils are equal, round, and reactive to light.   Neck: Normal range of motion. Neck supple. JVD present.   Cardiovascular: Normal rate and regular rhythm.   Murmur heard.  Pulmonary/Chest: Effort normal and breath sounds normal.   Abdominal: Soft. Bowel sounds are normal.   Musculoskeletal: Normal range of motion.   Neurological: He is alert and oriented to person, place, and time.   Skin: Skin is warm and dry.   Psychiatric: He has a normal mood and affect. His behavior is normal.   Nursing note and vitals reviewed.    Significant Labs:  CBC:  Recent Labs   Lab  08/20/18 0046  08/21/18 0316   WBC  8.48  8.48  7.30   RBC  4.43*  4.43*  4.31*   HGB  12.6*  12.6*  12.7*   HCT  39.4*  39.4*  37.9*   PLT  81*  81*  90*   MCV  89  89  88   MCH  28.4  28.4  29.5   MCHC  32.0  32.0  33.5     BNP:  Recent Labs   Lab  08/20/18 0046   BNP  1,860*     CMP:  Recent Labs   Lab  08/20/18   0046  08/20/18 0450  08/20/18 1757 08/21/18 0316   GLU  132*  132*  131*  212*  183*   CALCIUM  9.4  9.4  9.2  9.3  9.4   ALBUMIN  3.2*  3.2*   --    --    --    PROT  6.1  6.1   --    --    --    NA  136  136  137  138  138   K  4.8  4.8  5.0  4.1  3.7   CO2  30*  30*  30*  37*  38*   CL  96  96  97  91*  89*   BUN  21*  21*  21*  23*  25*   CREATININE  1.1  1.1  1.1  1.3  1.2   ALKPHOS  95  95   --    --    --    ALT  365*  365*   --    --    --    AST  51*  51*   --    --    --    BILITOT  1.2*  1.2*   --    --    --       Coagulation:   Recent Labs   Lab  08/20/18   0450  08/20/18   0957  08/20/18 1757 08/21/18   0316   INR  1.6*   --    --   1.2   APTT   --   31.6  33.1*  34.0*     LDH:  No results for input(s): LDH in the last 72 hours.  Microbiology:  Microbiology Results (last 7 days)     ** No results found for the last 168 hours. **        I have reviewed all pertinent labs within the  past 24 hours.    Estimated Creatinine Clearance: 63.5 mL/min (based on SCr of 1.2 mg/dL).    Diagnostic Results:  I have reviewed all pertinent imaging results/findings within the past 24 hours.    Assessment and Plan:     Mr. Harley is a 58 yoM, admitted to hospitals service with acute decompensated HF and LVAD work up. 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 DC-ICD (08/11/2018), ARI thrombus, COPD with Former smoker 1.5ppd x 50 years (quit 10 days ago), NIDDM type II, Hypertension, HLD, SVT, PE on Xarelto, MARK not on Cpap, GERD, cocaine use, schizophrenia, medication noncompliance. Admitted at [facility] from 8/14/18- 8/19/18 with cardiogenic shock leading to HARPAL, elevated LFTs, recurrent VT/Afib/AFL. Initially p/w for ICD placement, which was uncomplicated and d/c home following day. Day later p/w increase SOB, acute CHF and tachycardiac, his metoprolol and sotalol was increased and again discharged home following day. That evening he started felling weak with chills, presented to ER and round to have HARPAL, elevated LFTs, cardiogenic shock SBP 60-80s. Started on Dobutamine ggt and admitted to ICU, course of his admission dobutamine was weaned off with initiation of metoprolol lead to symptomatic hypotension. Interrogation of his ICD per report noted SVT in 230s. Patient seen by Cardiology and recommend further evaluation at [facility] for LVAD, poor prognosis with NYHA III/IV with significant MCCLAIN/taxing with minimal activity and further leads to SVT with subsequent shocks. Also seen by GI for elevated LFTs, likely d/t passive congestive hepatopathy, recommended to optimize HF.     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 He was first dx of HF at [facility] 01/15/18 presented with AECHF and LLL PNA. Had a [facility]  completed which noted non-obstructive CAD (no reports in file of this). Follows up with Cardiology Dr. Montana in [place]. Xarelto last dose 08/19/19 0800 for Afib/ AFL. Since ICD placement it has fired x1 two days ago, [employer] interrogation on 08/18/18 revealed Mode VVI, HR 40BPM, VT-1 at 166NPM to monitor, VT-2 181BPM with ATP x3 followed by DCCV, VF 230BPM ATP x1 followed by DCCV (VT rate upto 230BPM into VF zone with failure of ATP x1 and subsequent 30j dccv on 8/18/18 at 11:32am)       Work up at [facility]:   RUQ us 8/15/18 consistent with some degree of cirrhosis with portal HtN.   Cr 0.9, ALB 3.1, T bili 1.3, AST//614, CPK 63, Mag 1.9, Digoxcin 0.87, INR 3.9, Troponin 0.04, BNP 1684, Hep C / Hep B negative,   Medications held Digoxin 0.25mg, Sotalol 80mg BID, Lipitor 40mg, Doxazosin 4mg,       EKG: ST with frequent PVCs, , RBBB with , Left axis deviation, inferior lead Q-waves noted,     * Cardiogenic shock    -Diuresed extremely well overnight. Still hypervolemic on exam. Continue Lasix 10mg/hr.  -Will likely eventually need RHC / LHC  -Holding off on inotropes in light of frequent ICD shocks. Appreciate EP help as below.   -Ambulate as tolerated, PT / OT        Elevated LFTs    - In the setting of congestive hepatopathy, seen by GI OSH.   - Hep B/C non reactive.  - Liver u/s consistent with cirrhosis and portal HTN.  - Holding statin therapy.         GERD (gastroesophageal reflux disease)    - continue PPI         A-fib    - Currently in NSR on EKG, hx of AFL / Afib on Xarelto.  - Maintain K > 4, Mag > 2.  - Continue Heparin gtt.  - -Appreciate EP help. IV amio load followed by PO load(400mg BID for 2 weeks then decrease to 200mg daily). Will transition to PO today.         V-tach    -S/p Fortify [employer] DC-ICD. VR 1357-40Q ICD by Dr. Johnson  On 08/10/18        HLD (hyperlipidemia)    - Follow up CMP if LFTs down trending, okay with low potency statins lipitor 20mg         Pulmonary emphysema    -Pt currently smoking 1.5 PPD for the past 50 years.  -Continue Nicotine patch  -Resume Inhalers Breo, Albuterol  -Wean off oxygen as tolerated  -IS / AF        HARPAL (acute kidney injury)    - Cr stable.  - Will monitor with Lasix IV  - Hold Nephrotoxic medications, no Contrast, Keep normotensive and euvolemic              NICM (nonischemic cardiomyopathy)    CM to assist with obtaining [facility] records and films             Best Solorzano NP  Heart Transplant  [facility]

## 2018-08-21 NOTE — ASSESSMENT & PLAN NOTE
-Pt currently smoking 1.5 PPD for the past 50 years.  -Continue Nicotine patch  -Resume Inhalers Breo, Albuterol  -Wean off oxygen as tolerated  -IS / AF

## 2018-08-21 NOTE — ASSESSMENT & PLAN NOTE
- Currently in NSR on EKG, hx of AFL / Afib on Xarelto.  - Maintain K > 4, Mag > 2.  - Continue Heparin gtt.  - -Appreciate EP help. IV amio load followed by PO load(400mg BID for 2 weeks then decrease to 200mg daily). Will transition to PO today.

## 2018-08-22 PROBLEM — I42.8 NICM (NONISCHEMIC CARDIOMYOPATHY): Status: RESOLVED | Noted: 2018-08-20 | Resolved: 2018-08-22

## 2018-08-22 PROBLEM — E78.5 HLD (HYPERLIPIDEMIA): Status: RESOLVED | Noted: 2018-08-20 | Resolved: 2018-08-22

## 2018-08-22 LAB
ALBUMIN SERPL BCP-MCNC: 3.5 G/DL
ALP SERPL-CCNC: 98 U/L
ALT SERPL W/O P-5'-P-CCNC: 199 U/L
ANION GAP SERPL CALC-SCNC: 11 MMOL/L
APTT BLDCRRT: 45.3 SEC
APTT BLDCRRT: 72.4 SEC
APTT BLDCRRT: 72.4 SEC
AST SERPL-CCNC: 25 U/L
BILIRUB SERPL-MCNC: 1.3 MG/DL
BUN SERPL-MCNC: 22 MG/DL
CALCIUM SERPL-MCNC: 9.5 MG/DL
CHLORIDE SERPL-SCNC: 88 MMOL/L
CO2 SERPL-SCNC: 38 MMOL/L
CREAT SERPL-MCNC: 1.3 MG/DL
EST. GFR  (AFRICAN AMERICAN): >60 ML/MIN/1.73 M^2
EST. GFR  (NON AFRICAN AMERICAN): >60 ML/MIN/1.73 M^2
GLUCOSE SERPL-MCNC: 111 MG/DL
INR PPP: 1.2
MAGNESIUM SERPL-MCNC: 1.6 MG/DL
POCT GLUCOSE: 109 MG/DL (ref 70–110)
POCT GLUCOSE: 157 MG/DL (ref 70–110)
POCT GLUCOSE: 242 MG/DL (ref 70–110)
POTASSIUM SERPL-SCNC: 3.7 MMOL/L
PROT SERPL-MCNC: 6.7 G/DL
PROTHROMBIN TIME: 12.2 SEC
SODIUM SERPL-SCNC: 137 MMOL/L

## 2018-08-22 PROCEDURE — 80053 COMPREHEN METABOLIC PANEL: CPT

## 2018-08-22 PROCEDURE — 63600175 PHARM REV CODE 636 W HCPCS: Performed by: NURSE PRACTITIONER

## 2018-08-22 PROCEDURE — 20600001 HC STEP DOWN PRIVATE ROOM

## 2018-08-22 PROCEDURE — S4991 NICOTINE PATCH NONLEGEND: HCPCS | Performed by: STUDENT IN AN ORGANIZED HEALTH CARE EDUCATION/TRAINING PROGRAM

## 2018-08-22 PROCEDURE — 85730 THROMBOPLASTIN TIME PARTIAL: CPT

## 2018-08-22 PROCEDURE — 85610 PROTHROMBIN TIME: CPT

## 2018-08-22 PROCEDURE — 25000003 PHARM REV CODE 250: Performed by: STUDENT IN AN ORGANIZED HEALTH CARE EDUCATION/TRAINING PROGRAM

## 2018-08-22 PROCEDURE — 85730 THROMBOPLASTIN TIME PARTIAL: CPT | Mod: 91

## 2018-08-22 PROCEDURE — 36415 COLL VENOUS BLD VENIPUNCTURE: CPT

## 2018-08-22 PROCEDURE — 99233 SBSQ HOSP IP/OBS HIGH 50: CPT | Mod: ,,, | Performed by: INTERNAL MEDICINE

## 2018-08-22 PROCEDURE — A4216 STERILE WATER/SALINE, 10 ML: HCPCS | Performed by: STUDENT IN AN ORGANIZED HEALTH CARE EDUCATION/TRAINING PROGRAM

## 2018-08-22 PROCEDURE — 83735 ASSAY OF MAGNESIUM: CPT

## 2018-08-22 PROCEDURE — 25000003 PHARM REV CODE 250: Performed by: NURSE PRACTITIONER

## 2018-08-22 RX ORDER — FUROSEMIDE 10 MG/ML
80 INJECTION INTRAMUSCULAR; INTRAVENOUS 2 TIMES DAILY
Status: DISCONTINUED | OUTPATIENT
Start: 2018-08-22 | End: 2018-08-23

## 2018-08-22 RX ADMIN — FUROSEMIDE 80 MG: 10 INJECTION, SOLUTION INTRAMUSCULAR; INTRAVENOUS at 05:08

## 2018-08-22 RX ADMIN — AMIODARONE HYDROCHLORIDE 400 MG: 200 TABLET ORAL at 09:08

## 2018-08-22 RX ADMIN — LISINOPRIL 2.5 MG: 2.5 TABLET ORAL at 09:08

## 2018-08-22 RX ADMIN — MAGNESIUM OXIDE TAB 400 MG (241.3 MG ELEMENTAL MG) 400 MG: 400 (241.3 MG) TAB at 07:08

## 2018-08-22 RX ADMIN — SPIRONOLACTONE 25 MG: 25 TABLET, FILM COATED ORAL at 09:08

## 2018-08-22 RX ADMIN — NICOTINE 1 PATCH: 21 PATCH, EXTENDED RELEASE TRANSDERMAL at 07:08

## 2018-08-22 RX ADMIN — AMIODARONE HYDROCHLORIDE 400 MG: 200 TABLET ORAL at 07:08

## 2018-08-22 RX ADMIN — HEPARIN SODIUM AND DEXTROSE 14 UNITS/KG/HR: 10000; 5 INJECTION INTRAVENOUS at 06:08

## 2018-08-22 RX ADMIN — POTASSIUM CHLORIDE 40 MEQ: 1500 TABLET, EXTENDED RELEASE ORAL at 09:08

## 2018-08-22 RX ADMIN — LISINOPRIL 2.5 MG: 2.5 TABLET ORAL at 07:08

## 2018-08-22 RX ADMIN — FLUTICASONE FUROATE AND VILANTEROL TRIFENATATE 1 PUFF: 200; 25 POWDER RESPIRATORY (INHALATION) at 09:08

## 2018-08-22 RX ADMIN — MAGNESIUM OXIDE TAB 400 MG (241.3 MG ELEMENTAL MG) 400 MG: 400 (241.3 MG) TAB at 09:08

## 2018-08-22 RX ADMIN — INSULIN ASPART 1 UNITS: 100 INJECTION, SOLUTION INTRAVENOUS; SUBCUTANEOUS at 07:08

## 2018-08-22 RX ADMIN — Medication 3 ML: at 05:08

## 2018-08-22 RX ADMIN — PANTOPRAZOLE SODIUM 40 MG: 40 TABLET, DELAYED RELEASE ORAL at 09:08

## 2018-08-22 NOTE — PLAN OF CARE
Problem: Patient Care Overview  Goal: Plan of Care Review  Outcome: Ongoing (interventions implemented as appropriate)  Pt had good day; no complaints; d/c lasix gtt and changed to IVP; free of falls and injuries

## 2018-08-22 NOTE — ASSESSMENT & PLAN NOTE
- Improving with diuresis. In the setting of congestive hepatopathy, seen by GI OSH.   - CT with normal liver attenuation  - Hep B/C non reactive.  - Holding statin therapy.

## 2018-08-22 NOTE — PROGRESS NOTES
[facility]  Heart Transplant  Progress Note    Patient Name: Colton Harley  MRN: [id number]  Admission Date: 8/19/2018  Hospital Length of Stay: 3 days  Attending Physician: Reba Brewster MD  Primary Care Provider: Primary Doctor No  Principal Problem:Cardiogenic shock    Subjective:     Interval History: Feels much better this am. Diuresing well.     Continuous Infusions:   furosemide (LASIX) 2 mg/mL infusion (non-titrating) 10 mg/hr (08/21/18 1926)    heparin (porcine) in D5W 14 Units/kg/hr (08/22/18 0629)     Scheduled Meds:   amiodarone  400 mg Oral BID    fluticasone-vilanterol  1 puff Inhalation Daily    lisinopril  2.5 mg Oral BID    magnesium oxide  400 mg Oral BID    nicotine  1 patch Transdermal Q24H    pantoprazole  40 mg Oral Daily    polyethylene glycol  17 g Oral Daily    potassium chloride  40 mEq Oral Daily    sodium chloride 0.9%  3 mL Intravenous Q8H    spironolactone  25 mg Oral Daily     PRN Meds:acetaminophen, albuterol, dextrose 50%, glucagon (human recombinant), heparin (PORCINE), heparin (PORCINE), insulin aspart U-100, ondansetron    Review of patient's allergies indicates:   Allergen Reactions    Buspirone Palpitations     Objective:     Vital Signs (Most Recent):  Temp: 98.1 °F (36.7 °C) (08/22/18 0723)  Pulse: 92 (08/22/18 0943)  Resp: 16 (08/22/18 0943)  BP: 92/62 (08/22/18 0723)  SpO2: (!) 94 % (08/22/18 0943) Vital Signs (24h Range):  Temp:  [96.6 °F (35.9 °C)-98.2 °F (36.8 °C)] 98.1 °F (36.7 °C)  Pulse:  [] 92  Resp:  [16-20] 16  SpO2:  [91 %-96 %] 94 %  BP: ()/(62-69) 92/62     Patient Vitals for the past 72 hrs (Last 3 readings):   Weight   08/21/18 0712 68 kg (149 lb 14.6 oz)   08/19/18 2350 73.1 kg (161 lb 2.5 oz)     Body mass index is 23.48 kg/m².    Intake/Output Summary (Last 24 hours) at 8/22/2018 1045  Last data filed at 8/22/2018 0600  Gross per 24 hour   Intake 2350 ml   Output 5375 ml   Net -3025 ml        Telemetry:  nsr    Physical Exam   Constitutional: He is oriented to person, place, and time. He appears well-developed and well-nourished.   HENT:   Head: Normocephalic.   Eyes: Pupils are equal, round, and reactive to light.   Neck: Normal range of motion. Neck supple. JVD present.   Cardiovascular: Normal rate and regular rhythm.   Murmur heard.  Pulmonary/Chest: Effort normal and breath sounds normal.   Abdominal: Soft. Bowel sounds are normal.   Musculoskeletal: Normal range of motion.   Neurological: He is alert and oriented to person, place, and time.   Skin: Skin is warm and dry.   Psychiatric: He has a normal mood and affect. His behavior is normal.   Nursing note and vitals reviewed.    Significant Labs:  CBC:  Recent Labs   Lab  08/20/18   0046  08/21/18   0316   WBC  8.48  8.48  7.30   RBC  4.43*  4.43*  4.31*   HGB  12.6*  12.6*  12.7*   HCT  39.4*  39.4*  37.9*   PLT  81*  81*  90*   MCV  89  89  88   MCH  28.4  28.4  29.5   MCHC  32.0  32.0  33.5     BNP:  Recent Labs   Lab  08/20/18   0046   BNP  1,860*     CMP:  Recent Labs   Lab  08/20/18   0046   08/20/18   1757  08/21/18   0316  08/21/18   1522  08/22/18   0434   GLU  132*  132*   < >  212*  183*   --   111*   CALCIUM  9.4  9.4   < >  9.3  9.4   --   9.5   ALBUMIN  3.2*  3.2*   --    --    --    --   3.5   PROT  6.1  6.1   --    --    --    --   6.7   NA  136  136   < >  138  138   --   137   K  4.8  4.8   < >  4.1  3.7  4.2  3.7   CO2  30*  30*   < >  37*  38*   --   38*   CL  96  96   < >  91*  89*   --   88*   BUN  21*  21*   < >  23*  25*   --   22*   CREATININE  1.1  1.1   < >  1.3  1.2   --   1.3   ALKPHOS  95  95   --    --    --    --   98   ALT  365*  365*   --    --    --    --   199*   AST  51*  51*   --    --    --    --   25   BILITOT  1.2*  1.2*   --    --    --    --   1.3*    < > = values in this interval not displayed.      Coagulation:   Recent Labs   Lab  08/20/18   0450   08/21/18   0316   08/21/18   1111  08/21/18    1750  08/22/18   0434   INR  1.6*   --   1.2   --    --    --   1.2   APTT   --    < >  34.0*   < >  45.8*  45.7*  72.4*  72.4*    < > = values in this interval not displayed.     LDH:  No results for input(s): LDH in the last 72 hours.  Microbiology:  Microbiology Results (last 7 days)     ** No results found for the last 168 hours. **        I have reviewed all pertinent labs within the past 24 hours.    Estimated Creatinine Clearance: 58.6 mL/min (based on SCr of 1.3 mg/dL).    Diagnostic Results:  I have reviewed all pertinent imaging results/findings within the past 24 hours.    Assessment and Plan:     Mr. Harley is a 58 yoM, admitted to [facility] service with acute decompensated HF and LVAD work up. 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 DC-ICD (08/11/2018), ARI thrombus, COPD with Former smoker 1.5ppd x 50 years (quit 10 days ago), NIDDM type II, Hypertension, HLD, SVT, PE on Xarelto, MARK not on Cpap, GERD, cocaine use, schizophrenia, medication noncompliance. Admitted at [facility] from 8/14/18- 8/19/18 with cardiogenic shock leading to HARPAL, elevated LFTs, recurrent VT/Afib/AFL. Initially p/w for ICD placement, which was uncomplicated and d/c home following day. Day later p/w increase SOB, acute CHF and tachycardiac, his metoprolol and sotalol was increased and again discharged home following day. That evening he started felling weak with chills, presented to ER and round to have HARPAL, elevated LFTs, cardiogenic shock SBP 60-80s. Started on Dobutamine ggt and admitted to ICU, course of his admission dobutamine was weaned off with initiation of metoprolol lead to symptomatic hypotension. Interrogation of his ICD per report noted SVT in 230s. Patient seen by Cardiology and recommend further evaluation at [facility] for LVAD, poor prognosis with NYHA III/IV with significant MCCLAIN/taxing with minimal activity and further leads to SVT with subsequent shocks. Also seen by GI for elevated LFTs, likely d/t  passive congestive hepatopathy, recommended to optimize HF.     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 He was first dx of HF at [facility] 01/15/18 presented with AECHF and LLL PNA. Had a LHC completed which noted non-obstructive CAD (no reports in file of this). Follows up with Cardiology Dr. Montana in [place]. Xarelto last dose 08/19/19 0800 for Afib/ AFL. Since ICD placement it has fired x1 two days ago, [employer] interrogation on 08/18/18 revealed Mode VVI, HR 40BPM, VT-1 at 166NPM to monitor, VT-2 181BPM with ATP x3 followed by DCCV, VF 230BPM ATP x1 followed by DCCV (VT rate upto 230BPM into VF zone with failure of ATP x1 and subsequent 30j dccv on 8/18/18 at 11:32am)       Work up at [facility]:   RUQ us 8/15/18 consistent with some degree of cirrhosis with portal HtN.   Cr 0.9, ALB 3.1, T bili 1.3, AST//614, CPK 63, Mag 1.9, Digoxcin 0.87, INR 3.9, Troponin 0.04, BNP 1684, Hep C / Hep B negative,   Medications held Digoxin 0.25mg, Sotalol 80mg BID, Lipitor 40mg, Doxazosin 4mg,       EKG: ST with frequent PVCs, , RBBB with , Left axis deviation, inferior lead Q-waves noted,     * Cardiogenic shock    -Diuresed extremely well overnight. Will transition to IVP Lasix today.   -Will likely eventually need RHC/LHC. Will schedule for tomorrow. Will need to hold heparin prior.   -Holding off on inotropes in light of frequent ICD shocks. Appreciate EP help as below.   -Ambulate as tolerated, PT/OT.        Elevated LFTs    - Improving with diuresis. In the setting of congestive hepatopathy, seen by GI OSH.   - CT with normal liver attenuation  - Hep B/C non reactive.  - Holding statin therapy.         GERD (gastroesophageal reflux disease)    - continue PPI         A-fib    - Currently in NSR  on EKG, hx of AFL / Afib on Xarelto.  - Maintain K > 4, Mag > 2.  - Continue Heparin gtt.  - -Appreciate EP help. IV amio load completed. Continue PO load(400mg BID for 2 weeks then decrease to 200mg daily).         V-tach    -S/p Fortify [employer] DC-ICD. VR 1357-40Q ICD by Dr. Johnson  On 08/10/18        Pulmonary emphysema    -Pt currently smoking 1.5 PPD for the past 50 years.  -Continue Nicotine patch  -Resume Inhalers Breo, Albuterol  -Wean off oxygen as tolerated  -IS / AF        HARPAL (acute kidney injury)    - Cr stable.  - Will monitor with Lasix IV  - Hold Nephrotoxic medications, no Contrast, Keep normotensive and euvolemic                  Best Solorzano NP  Heart Transplant  [facility]

## 2018-08-22 NOTE — PLAN OF CARE
Problem: Patient Care Overview  Goal: Plan of Care Review  Plan of care discussed with patient. Patient is free of fall/trauma/injury. Amiodarone gtt stopped at 2100 per orders.  PO amiodarone started. Heparin and lasix gtt continuous per MAR. Denies CP, SOB, or pain/discomfort. All questions addressed. Will continue to monitor.

## 2018-08-22 NOTE — ASSESSMENT & PLAN NOTE
-Diuresed extremely well overnight. Will transition to IVP Lasix today.   -Will likely eventually need RHC/LHC. Will schedule for tomorrow. Will need to hold heparin prior.   -Holding off on inotropes in light of frequent ICD shocks. Appreciate EP help as below.   -Ambulate as tolerated, PT/OT.

## 2018-08-22 NOTE — SUBJECTIVE & OBJECTIVE
Interval History: Feels much better this am. Diuresing well.     Continuous Infusions:   furosemide (LASIX) 2 mg/mL infusion (non-titrating) 10 mg/hr (08/21/18 1926)    heparin (porcine) in D5W 14 Units/kg/hr (08/22/18 0629)     Scheduled Meds:   amiodarone  400 mg Oral BID    fluticasone-vilanterol  1 puff Inhalation Daily    lisinopril  2.5 mg Oral BID    magnesium oxide  400 mg Oral BID    nicotine  1 patch Transdermal Q24H    pantoprazole  40 mg Oral Daily    polyethylene glycol  17 g Oral Daily    potassium chloride  40 mEq Oral Daily    sodium chloride 0.9%  3 mL Intravenous Q8H    spironolactone  25 mg Oral Daily     PRN Meds:acetaminophen, albuterol, dextrose 50%, glucagon (human recombinant), heparin (PORCINE), heparin (PORCINE), insulin aspart U-100, ondansetron    Review of patient's allergies indicates:   Allergen Reactions    Buspirone Palpitations     Objective:     Vital Signs (Most Recent):  Temp: 98.1 °F (36.7 °C) (08/22/18 0723)  Pulse: 92 (08/22/18 0943)  Resp: 16 (08/22/18 0943)  BP: 92/62 (08/22/18 0723)  SpO2: (!) 94 % (08/22/18 0943) Vital Signs (24h Range):  Temp:  [96.6 °F (35.9 °C)-98.2 °F (36.8 °C)] 98.1 °F (36.7 °C)  Pulse:  [] 92  Resp:  [16-20] 16  SpO2:  [91 %-96 %] 94 %  BP: ()/(62-69) 92/62     Patient Vitals for the past 72 hrs (Last 3 readings):   Weight   08/21/18 0712 68 kg (149 lb 14.6 oz)   08/19/18 2350 73.1 kg (161 lb 2.5 oz)     Body mass index is 23.48 kg/m².    Intake/Output Summary (Last 24 hours) at 8/22/2018 1045  Last data filed at 8/22/2018 0600  Gross per 24 hour   Intake 2350 ml   Output 5375 ml   Net -3025 ml        Telemetry: nsr    Physical Exam   Constitutional: He is oriented to person, place, and time. He appears well-developed and well-nourished.   HENT:   Head: Normocephalic.   Eyes: Pupils are equal, round, and reactive to light.   Neck: Normal range of motion. Neck supple. JVD present.   Cardiovascular: Normal rate and regular  rhythm.   Murmur heard.  Pulmonary/Chest: Effort normal and breath sounds normal.   Abdominal: Soft. Bowel sounds are normal.   Musculoskeletal: Normal range of motion.   Neurological: He is alert and oriented to person, place, and time.   Skin: Skin is warm and dry.   Psychiatric: He has a normal mood and affect. His behavior is normal.   Nursing note and vitals reviewed.    Significant Labs:  CBC:  Recent Labs   Lab  08/20/18   0046  08/21/18 0316   WBC  8.48  8.48  7.30   RBC  4.43*  4.43*  4.31*   HGB  12.6*  12.6*  12.7*   HCT  39.4*  39.4*  37.9*   PLT  81*  81*  90*   MCV  89  89  88   MCH  28.4  28.4  29.5   MCHC  32.0  32.0  33.5     BNP:  Recent Labs   Lab  08/20/18 0046   BNP  1,860*     CMP:  Recent Labs   Lab  08/20/18   0046   08/20/18   1757  08/21/18 0316  08/21/18   1522  08/22/18   0434   GLU  132*  132*   < >  212*  183*   --   111*   CALCIUM  9.4  9.4   < >  9.3  9.4   --   9.5   ALBUMIN  3.2*  3.2*   --    --    --    --   3.5   PROT  6.1  6.1   --    --    --    --   6.7   NA  136  136   < >  138  138   --   137   K  4.8  4.8   < >  4.1  3.7  4.2  3.7   CO2  30*  30*   < >  37*  38*   --   38*   CL  96  96   < >  91*  89*   --   88*   BUN  21*  21*   < >  23*  25*   --   22*   CREATININE  1.1  1.1   < >  1.3  1.2   --   1.3   ALKPHOS  95  95   --    --    --    --   98   ALT  365*  365*   --    --    --    --   199*   AST  51*  51*   --    --    --    --   25   BILITOT  1.2*  1.2*   --    --    --    --   1.3*    < > = values in this interval not displayed.      Coagulation:   Recent Labs   Lab  08/20/18   0450   08/21/18   0316   08/21/18   1111  08/21/18   1750  08/22/18   0434   INR  1.6*   --   1.2   --    --    --   1.2   APTT   --    < >  34.0*   < >  45.8*  45.7*  72.4*  72.4*    < > = values in this interval not displayed.     LDH:  No results for input(s): LDH in the last 72 hours.  Microbiology:  Microbiology Results (last 7 days)     ** No results found  for the last 168 hours. **        I have reviewed all pertinent labs within the past 24 hours.    Estimated Creatinine Clearance: 58.6 mL/min (based on SCr of 1.3 mg/dL).    Diagnostic Results:  I have reviewed all pertinent imaging results/findings within the past 24 hours.

## 2018-08-22 NOTE — ASSESSMENT & PLAN NOTE
- Currently in NSR on EKG, hx of AFL / Afib on Xarelto.  - Maintain K > 4, Mag > 2.  - Continue Heparin gtt.  - -Appreciate EP help. IV amio load completed. Continue PO load(400mg BID for 2 weeks then decrease to 200mg daily).

## 2018-08-23 PROBLEM — R57.0 CARDIOGENIC SHOCK: Status: RESOLVED | Noted: 2018-08-20 | Resolved: 2018-08-23

## 2018-08-23 LAB
ALBUMIN SERPL BCP-MCNC: 3.4 G/DL
ALP SERPL-CCNC: 89 U/L
ALT SERPL W/O P-5'-P-CCNC: 144 U/L
ANION GAP SERPL CALC-SCNC: 9 MMOL/L
APTT BLDCRRT: 54.4 SEC
AST SERPL-CCNC: 21 U/L
BASOPHILS # BLD AUTO: 0.04 K/UL
BASOPHILS NFR BLD: 0.5 %
BILIRUB SERPL-MCNC: 1.1 MG/DL
BUN SERPL-MCNC: 36 MG/DL
CALCIUM SERPL-MCNC: 9.4 MG/DL
CHLORIDE SERPL-SCNC: 90 MMOL/L
CO2 SERPL-SCNC: 35 MMOL/L
CREAT SERPL-MCNC: 2.3 MG/DL
DIFFERENTIAL METHOD: ABNORMAL
EOSINOPHIL # BLD AUTO: 0.1 K/UL
EOSINOPHIL NFR BLD: 1.7 %
ERYTHROCYTE [DISTWIDTH] IN BLOOD BY AUTOMATED COUNT: 16.7 %
EST. GFR  (AFRICAN AMERICAN): 35.1 ML/MIN/1.73 M^2
EST. GFR  (NON AFRICAN AMERICAN): 30.4 ML/MIN/1.73 M^2
GLUCOSE SERPL-MCNC: 124 MG/DL
HCT VFR BLD AUTO: 40.8 %
HGB BLD-MCNC: 13.1 G/DL
IMM GRANULOCYTES # BLD AUTO: 0.04 K/UL
IMM GRANULOCYTES NFR BLD AUTO: 0.5 %
INR PPP: 1.1
LYMPHOCYTES # BLD AUTO: 2.5 K/UL
LYMPHOCYTES NFR BLD: 33.5 %
MAGNESIUM SERPL-MCNC: 2 MG/DL
MCH RBC QN AUTO: 28.2 PG
MCHC RBC AUTO-ENTMCNC: 32.1 G/DL
MCV RBC AUTO: 88 FL
MONOCYTES # BLD AUTO: 0.7 K/UL
MONOCYTES NFR BLD: 9.2 %
NEUTROPHILS # BLD AUTO: 4.1 K/UL
NEUTROPHILS NFR BLD: 54.6 %
NRBC BLD-RTO: 0 /100 WBC
PLATELET # BLD AUTO: 145 K/UL
PMV BLD AUTO: 9.4 FL
POCT GLUCOSE: 165 MG/DL (ref 70–110)
POCT GLUCOSE: 244 MG/DL (ref 70–110)
POTASSIUM SERPL-SCNC: 5.1 MMOL/L
PROT SERPL-MCNC: 6.6 G/DL
PROTHROMBIN TIME: 11.4 SEC
RBC # BLD AUTO: 4.65 M/UL
SODIUM SERPL-SCNC: 134 MMOL/L
WBC # BLD AUTO: 7.46 K/UL

## 2018-08-23 PROCEDURE — 93451 RIGHT HEART CATH: CPT

## 2018-08-23 PROCEDURE — 85025 COMPLETE CBC W/AUTO DIFF WBC: CPT

## 2018-08-23 PROCEDURE — S4991 NICOTINE PATCH NONLEGEND: HCPCS | Performed by: STUDENT IN AN ORGANIZED HEALTH CARE EDUCATION/TRAINING PROGRAM

## 2018-08-23 PROCEDURE — 85610 PROTHROMBIN TIME: CPT

## 2018-08-23 PROCEDURE — 83735 ASSAY OF MAGNESIUM: CPT

## 2018-08-23 PROCEDURE — 25000003 PHARM REV CODE 250: Performed by: STUDENT IN AN ORGANIZED HEALTH CARE EDUCATION/TRAINING PROGRAM

## 2018-08-23 PROCEDURE — 4A033B3 MEASUREMENT OF ARTERIAL PRESSURE, PULMONARY, PERCUTANEOUS APPROACH: ICD-10-PCS | Performed by: INTERNAL MEDICINE

## 2018-08-23 PROCEDURE — 25000003 PHARM REV CODE 250: Performed by: NURSE PRACTITIONER

## 2018-08-23 PROCEDURE — 20600001 HC STEP DOWN PRIVATE ROOM

## 2018-08-23 PROCEDURE — 4A0239Z MEASUREMENT OF CARDIAC OUTPUT, PERCUTANEOUS APPROACH: ICD-10-PCS | Performed by: INTERNAL MEDICINE

## 2018-08-23 PROCEDURE — 4A023N6 MEASUREMENT OF CARDIAC SAMPLING AND PRESSURE, RIGHT HEART, PERCUTANEOUS APPROACH: ICD-10-PCS | Performed by: INTERNAL MEDICINE

## 2018-08-23 PROCEDURE — 80053 COMPREHEN METABOLIC PANEL: CPT

## 2018-08-23 PROCEDURE — 36415 COLL VENOUS BLD VENIPUNCTURE: CPT

## 2018-08-23 PROCEDURE — 93451 RIGHT HEART CATH: CPT | Mod: 26,,, | Performed by: INTERNAL MEDICINE

## 2018-08-23 PROCEDURE — 99233 SBSQ HOSP IP/OBS HIGH 50: CPT | Mod: ,,, | Performed by: INTERNAL MEDICINE

## 2018-08-23 PROCEDURE — 25000003 PHARM REV CODE 250

## 2018-08-23 PROCEDURE — 85730 THROMBOPLASTIN TIME PARTIAL: CPT

## 2018-08-23 RX ADMIN — MAGNESIUM OXIDE TAB 400 MG (241.3 MG ELEMENTAL MG) 400 MG: 400 (241.3 MG) TAB at 08:08

## 2018-08-23 RX ADMIN — AMIODARONE HYDROCHLORIDE 400 MG: 200 TABLET ORAL at 09:08

## 2018-08-23 RX ADMIN — MAGNESIUM OXIDE TAB 400 MG (241.3 MG ELEMENTAL MG) 400 MG: 400 (241.3 MG) TAB at 09:08

## 2018-08-23 RX ADMIN — FLUTICASONE FUROATE AND VILANTEROL TRIFENATATE 1 PUFF: 200; 25 POWDER RESPIRATORY (INHALATION) at 08:08

## 2018-08-23 RX ADMIN — PANTOPRAZOLE SODIUM 40 MG: 40 TABLET, DELAYED RELEASE ORAL at 08:08

## 2018-08-23 RX ADMIN — NICOTINE 1 PATCH: 21 PATCH, EXTENDED RELEASE TRANSDERMAL at 08:08

## 2018-08-23 RX ADMIN — AMIODARONE HYDROCHLORIDE 400 MG: 200 TABLET ORAL at 08:08

## 2018-08-23 RX ADMIN — INSULIN ASPART 2 UNITS: 100 INJECTION, SOLUTION INTRAVENOUS; SUBCUTANEOUS at 12:08

## 2018-08-23 NOTE — ASSESSMENT & PLAN NOTE
- Likely secondary to overdiuresis.   - Holding diuretics/ACEI/AA.   -RHC today.  - Hold Nephrotoxic medications, no Contrast, Keep normotensive and euvolemic

## 2018-08-23 NOTE — ASSESSMENT & PLAN NOTE
-Diuresed extremely well since admit.  -Will hold lasix this am in light of HARPAL.   -RHC today.  -Holding ACEI/AA in light of HARPAL as well.   -Holding off on inotropes in light of frequent ICD shocks. Appreciate EP help as below.   -Ambulate as tolerated, PT/OT.

## 2018-08-23 NOTE — ASSESSMENT & PLAN NOTE
- Currently in NSR on EKG, hx of AFL / Afib on Xarelto.  - Maintain K > 4, Mag > 2.  - Heparin stopped.  - -Appreciate EP help. IV amio load completed. Continue PO load(400mg BID for 2 weeks then decrease to 200mg daily).

## 2018-08-23 NOTE — PLAN OF CARE
Problem: Patient Care Overview  Goal: Plan of Care Review  Outcome: Ongoing (interventions implemented as appropriate)  Plan of care reviewed with patient. Patient states understanding. Right heart cath completed with results pending. Patient remains free from injury. No acute events noted at this time. Vital signs stable. Call bell in reach. Bed locked and in lowest position. Will continue to monitor patient.

## 2018-08-23 NOTE — PHYSICIAN QUERY
PT Name: Colton Harley  MR #: [id number]     Physician Query Form - Documentation Clarification      CDS/: Pavithra Babin RN, CCDS             Contact information: [email or web address]    This form is a permanent document in the medical record.     Query Date: August 23, 2018    By submitting this query, we are merely seeking further clarification of documentation. Please utilize your independent clinical judgment when addressing the question(s) below.    The Medical record reflects the following:    Supporting Clinical Findings Location in Medical Record     PMhx NICM/HPrEF (LVef 10-15%) s/p [employer] DC-ICD (08/11/2018 8/20 h/p     CONCLUSIONS     1 - Severely depressed left ventricular systolic function (EF 15-20%).     2 - Right ventricular enlargement with severely depressed systolic function.     3 - Restrictive LV filling pattern, indicating markedly elevated LAP (grade 3 diastolic dysfunction).     4 - Right atrial enlargement.     5 - Moderate tricuspid regurgitation.     6 - Mild mitral regurgitation.     7 - Pulmonary hypertension. The estimated PA systolic pressure is 51 mmHg.     8 - Increased central venous pressure.        8/20 echo                                                                            Doctor, Please specify diagnosis or diagnoses associated with above clinical findings.  Please further specify type of NICM.    Provider Use Only    (   x )  Dilated    (    )  Restrictive    (    ) Other, please specify_____________                                                                                                                         [  ] Clinically undetermined

## 2018-08-23 NOTE — PROGRESS NOTES
[facility]  Heart Transplant  Progress Note    Patient Name: Colton Harley  MRN: [id number]  Admission Date: 8/19/2018  Hospital Length of Stay: 4 days  Attending Physician: Reba Brewster MD  Primary Care Provider: Primary Doctor No  Principal Problem:Acute on chronic combined systolic and diastolic heart failure    Subjective:     Interval History: Feels much better this am. States that he is able to get up and move around better than he has in a very long time.       Scheduled Meds:   amiodarone  400 mg Oral BID    fluticasone-vilanterol  1 puff Inhalation Daily    magnesium oxide  400 mg Oral BID    nicotine  1 patch Transdermal Q24H    pantoprazole  40 mg Oral Daily    polyethylene glycol  17 g Oral Daily    sodium chloride 0.9%  3 mL Intravenous Q8H     PRN Meds:acetaminophen, albuterol, dextrose 50%, glucagon (human recombinant), insulin aspart U-100, ondansetron    Review of patient's allergies indicates:   Allergen Reactions    Buspirone Palpitations     Objective:     Vital Signs (Most Recent):  Temp: 97.7 °F (36.5 °C) (08/23/18 0457)  Pulse: 87 (08/23/18 0704)  Resp: 18 (08/22/18 1906)  BP: (!) 84/57 (08/23/18 0457)  SpO2: (!) 93 % (08/23/18 0457) Vital Signs (24h Range):  Temp:  [97.5 °F (36.4 °C)-98 °F (36.7 °C)] 97.7 °F (36.5 °C)  Pulse:  [86-99] 87  Resp:  [16-18] 18  SpO2:  [91 %-95 %] 93 %  BP: ()/(56-66) 84/57     Patient Vitals for the past 72 hrs (Last 3 readings):   Weight   08/23/18 0457 65.5 kg (144 lb 6.4 oz)   08/22/18 0721 64.5 kg (142 lb 3.2 oz)   08/21/18 0712 68 kg (149 lb 14.6 oz)     Body mass index is 22.62 kg/m².    Intake/Output Summary (Last 24 hours) at 8/23/2018 0814  Last data filed at 8/23/2018 0600  Gross per 24 hour   Intake 960 ml   Output 3525 ml   Net -2565 ml        Telemetry: nsr    Physical Exam   Constitutional: He is oriented to person, place, and time. He appears well-developed and well-nourished.   HENT:   Head: Normocephalic.    Eyes: Pupils are equal, round, and reactive to light.   Neck: Normal range of motion. Neck supple.   Cardiovascular: Normal rate and regular rhythm.   Murmur heard.  Pulmonary/Chest: Effort normal and breath sounds normal.   Abdominal: Soft. Bowel sounds are normal.   Musculoskeletal: Normal range of motion.   Neurological: He is alert and oriented to person, place, and time.   Skin: Skin is warm and dry.   Psychiatric: He has a normal mood and affect. His behavior is normal.   Nursing note and vitals reviewed.    Significant Labs:  CBC:  Recent Labs   Lab  08/20/18   0046  08/21/18 0316  08/23/18   0718   WBC  8.48  8.48  7.30  7.46   RBC  4.43*  4.43*  4.31*  4.65   HGB  12.6*  12.6*  12.7*  13.1*   HCT  39.4*  39.4*  37.9*  40.8   PLT  81*  81*  90*  145*   MCV  89  89  88  88   MCH  28.4  28.4  29.5  28.2   MCHC  32.0  32.0  33.5  32.1     BNP:  Recent Labs   Lab  08/20/18 0046   BNP  1,860*     CMP:  Recent Labs   Lab  08/20/18   0046   08/21/18   0316  08/21/18   1522  08/22/18   0434  08/23/18   0518   GLU  132*  132*   < >  183*   --   111*  124*   CALCIUM  9.4  9.4   < >  9.4   --   9.5  9.4   ALBUMIN  3.2*  3.2*   --    --    --   3.5  3.4*   PROT  6.1  6.1   --    --    --   6.7  6.6   NA  136  136   < >  138   --   137  134*   K  4.8  4.8   < >  3.7  4.2  3.7  5.1   CO2  30*  30*   < >  38*   --   38*  35*   CL  96  96   < >  89*   --   88*  90*   BUN  21*  21*   < >  25*   --   22*  36*   CREATININE  1.1  1.1   < >  1.2   --   1.3  2.3*   ALKPHOS  95  95   --    --    --   98  89   ALT  365*  365*   --    --    --   199*  144*   AST  51*  51*   --    --    --   25  21   BILITOT  1.2*  1.2*   --    --    --   1.3*  1.1*    < > = values in this interval not displayed.      Coagulation:   Recent Labs   Lab  08/21/18   0316   08/22/18   0434  08/22/18   1246  08/23/18   0518   INR  1.2   --   1.2   --   1.1   APTT  34.0*   < >  72.4*  72.4*  45.3*  54.4*  54.4*  54.4*    < > =  values in this interval not displayed.     LDH:  No results for input(s): LDH in the last 72 hours.  Microbiology:  Microbiology Results (last 7 days)     ** No results found for the last 168 hours. **        I have reviewed all pertinent labs within the past 24 hours.    Estimated Creatinine Clearance: 32.8 mL/min (A) (based on SCr of 2.3 mg/dL (H)).    Diagnostic Results:  I have reviewed all pertinent imaging results/findings within the past 24 hours.    Assessment and Plan:     Mr. Harley is a 58 yoM, admitted to [facility] service with acute decompensated HF and LVAD work up. 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 DC-ICD (08/11/2018), ARI thrombus, COPD with Former smoker 1.5ppd x 50 years (quit 10 days ago), NIDDM type II, Hypertension, HLD, SVT, PE on Xarelto, MARK not on Cpap, GERD, cocaine use, schizophrenia, medication noncompliance. Admitted at [facility] from 8/14/18- 8/19/18 with cardiogenic shock leading to HARPAL, elevated LFTs, recurrent VT/Afib/AFL. Initially p/w for ICD placement, which was uncomplicated and d/c home following day. Day later p/w increase SOB, acute CHF and tachycardiac, his metoprolol and sotalol was increased and again discharged home following day. That evening he started felling weak with chills, presented to ER and round to have HARPAL, elevated LFTs, cardiogenic shock SBP 60-80s. Started on Dobutamine ggt and admitted to ICU, course of his admission dobutamine was weaned off with initiation of metoprolol lead to symptomatic hypotension. Interrogation of his ICD per report noted SVT in 230s. Patient seen by Cardiology and recommend further evaluation at [facility] for LVAD, poor prognosis with NYHA III/IV with significant MCCLAIN/taxing with minimal activity and further leads to SVT with subsequent shocks. Also seen by GI for elevated LFTs, likely d/t passive congestive hepatopathy, recommended to optimize HF.     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 He was first dx of HF at [facility] 01/15/18 presented with AECHF and LLL PNA. Had a LHC completed which noted non-obstructive CAD (no reports in file of this). Follows up with Cardiology Dr. [place] in [place]. Xarelto last dose 08/19/19 0800 for Afib/ AFL. Since ICD placement it has fired x1 two days ago, [facility] [facility] interrogation on 08/18/18 revealed Mode VVI, HR 40BPM, VT-1 at 166NPM to monitor, VT-2 181BPM with ATP x3 followed by DCCV, VF 230BPM ATP x1 followed by DCCV (VT rate upto 230BPM into VF zone with failure of ATP x1 and subsequent 30j dccv on 8/18/18 at 11:32am)       Work up at [facility]:   RUQ us 8/15/18 consistent with some degree of cirrhosis with portal HtN.   Cr 0.9, ALB 3.1, T bili 1.3, AST//614, CPK 63, Mag 1.9, Digoxcin 0.87, INR 3.9, Troponin 0.04, BNP 1684, Hep C / Hep B negative,   Medications held Digoxin 0.25mg, Sotalol 80mg BID, Lipitor 40mg, Doxazosin 4mg,       EKG: ST with frequent PVCs, , RBBB with , Left axis deviation, inferior lead Q-waves noted,     * Acute on chronic combined systolic and diastolic heart failure    -Diuresed extremely well since admit.  -Will hold lasix this am in light of HARPAL.   -RHC today.  -Holding ACEI/AA in light of HARPAL as well.   -Holding off on inotropes in light of frequent ICD shocks. Appreciate EP help as below.   -Ambulate as tolerated, PT/OT.        HARPAL (acute kidney injury)    - Likely secondary to overdiuresis.   - Holding diuretics/ACEI/AA.   -RHC today.  - Hold Nephrotoxic medications, no Contrast, Keep normotensive and euvolemic              Elevated LFTs    - Improving with diuresis. In the setting of congestive hepatopathy, seen by GI OSH.   - CT with normal liver attenuation  - Hep B/C non reactive.  - Holding statin therapy.         GERD  (gastroesophageal reflux disease)    - continue PPI         A-fib    - Currently in NSR on EKG, hx of AFL / Afib on Xarelto.  - Maintain K > 4, Mag > 2.  - Heparin stopped.  - -Appreciate EP help. IV amio load completed. Continue PO load(400mg BID for 2 weeks then decrease to 200mg daily).         V-tach    -S/p Fortify [employer] [facility] [facility] DC-ICD. VR 1357-40Q ICD by Dr. Johnson  On 08/10/18        Pulmonary emphysema    -Pt currently smoking 1.5 PPD for the past 50 years.  -Continue Nicotine patch  -Resume Inhalers Breo, Albuterol  -O2 weaned off.   -IS / AF          Best Solorzano, NP  Heart Transplant  [facility]

## 2018-08-23 NOTE — SUBJECTIVE & OBJECTIVE
Interval History: Feels much better this am. States that he is able to get up and move around better than he has in a very long time.       Scheduled Meds:   amiodarone  400 mg Oral BID    fluticasone-vilanterol  1 puff Inhalation Daily    magnesium oxide  400 mg Oral BID    nicotine  1 patch Transdermal Q24H    pantoprazole  40 mg Oral Daily    polyethylene glycol  17 g Oral Daily    sodium chloride 0.9%  3 mL Intravenous Q8H     PRN Meds:acetaminophen, albuterol, dextrose 50%, glucagon (human recombinant), insulin aspart U-100, ondansetron    Review of patient's allergies indicates:   Allergen Reactions    Buspirone Palpitations     Objective:     Vital Signs (Most Recent):  Temp: 97.7 °F (36.5 °C) (08/23/18 0457)  Pulse: 87 (08/23/18 0704)  Resp: 18 (08/22/18 1906)  BP: (!) 84/57 (08/23/18 0457)  SpO2: (!) 93 % (08/23/18 0457) Vital Signs (24h Range):  Temp:  [97.5 °F (36.4 °C)-98 °F (36.7 °C)] 97.7 °F (36.5 °C)  Pulse:  [86-99] 87  Resp:  [16-18] 18  SpO2:  [91 %-95 %] 93 %  BP: ()/(56-66) 84/57     Patient Vitals for the past 72 hrs (Last 3 readings):   Weight   08/23/18 0457 65.5 kg (144 lb 6.4 oz)   08/22/18 0721 64.5 kg (142 lb 3.2 oz)   08/21/18 0712 68 kg (149 lb 14.6 oz)     Body mass index is 22.62 kg/m².    Intake/Output Summary (Last 24 hours) at 8/23/2018 0814  Last data filed at 8/23/2018 0600  Gross per 24 hour   Intake 960 ml   Output 3525 ml   Net -2565 ml        Telemetry: nsr    Physical Exam   Constitutional: He is oriented to person, place, and time. He appears well-developed and well-nourished.   HENT:   Head: Normocephalic.   Eyes: Pupils are equal, round, and reactive to light.   Neck: Normal range of motion. Neck supple.   Cardiovascular: Normal rate and regular rhythm.   Murmur heard.  Pulmonary/Chest: Effort normal and breath sounds normal.   Abdominal: Soft. Bowel sounds are normal.   Musculoskeletal: Normal range of motion.   Neurological: He is alert and oriented to  person, place, and time.   Skin: Skin is warm and dry.   Psychiatric: He has a normal mood and affect. His behavior is normal.   Nursing note and vitals reviewed.    Significant Labs:  CBC:  Recent Labs   Lab  08/20/18 0046 08/21/18 0316 08/23/18   0718   WBC  8.48  8.48  7.30  7.46   RBC  4.43*  4.43*  4.31*  4.65   HGB  12.6*  12.6*  12.7*  13.1*   HCT  39.4*  39.4*  37.9*  40.8   PLT  81*  81*  90*  145*   MCV  89  89  88  88   MCH  28.4  28.4  29.5  28.2   MCHC  32.0  32.0  33.5  32.1     BNP:  Recent Labs   Lab  08/20/18 0046   BNP  1,860*     CMP:  Recent Labs   Lab  08/20/18 0046 08/21/18 0316 08/21/18   1522  08/22/18   0434 08/23/18   0518   GLU  132*  132*   < >  183*   --   111*  124*   CALCIUM  9.4  9.4   < >  9.4   --   9.5  9.4   ALBUMIN  3.2*  3.2*   --    --    --   3.5  3.4*   PROT  6.1  6.1   --    --    --   6.7  6.6   NA  136  136   < >  138   --   137  134*   K  4.8  4.8   < >  3.7  4.2  3.7  5.1   CO2  30*  30*   < >  38*   --   38*  35*   CL  96  96   < >  89*   --   88*  90*   BUN  21*  21*   < >  25*   --   22*  36*   CREATININE  1.1  1.1   < >  1.2   --   1.3  2.3*   ALKPHOS  95  95   --    --    --   98  89   ALT  365*  365*   --    --    --   199*  144*   AST  51*  51*   --    --    --   25  21   BILITOT  1.2*  1.2*   --    --    --   1.3*  1.1*    < > = values in this interval not displayed.      Coagulation:   Recent Labs   Lab  08/21/18 0316 08/22/18   0434 08/22/18   1246  08/23/18   0518   INR  1.2   --   1.2   --   1.1   APTT  34.0*   < >  72.4*  72.4*  45.3*  54.4*  54.4*  54.4*    < > = values in this interval not displayed.     LDH:  No results for input(s): LDH in the last 72 hours.  Microbiology:  Microbiology Results (last 7 days)     ** No results found for the last 168 hours. **        I have reviewed all pertinent labs within the past 24 hours.    Estimated Creatinine Clearance: 32.8 mL/min (A) (based on SCr of 2.3 mg/dL  (H)).    Diagnostic Results:  I have reviewed all pertinent imaging results/findings within the past 24 hours.

## 2018-08-23 NOTE — INTERVAL H&P NOTE
Here for RHC to assess hemodynamics in pt with ADHF     RHC R IJ, 7 Fr sheath with local lidocaine, micropuncture kit and US guidance.    I have explained the risks, benefits and alternatives of the procedure in detail. The patient voices understanding and all questions have been answered,. The patient agrees to proceed as planned.

## 2018-08-23 NOTE — PLAN OF CARE
Problem: Patient Care Overview  Goal: Plan of Care Review  Plan of care discussed with patient. Patient is free of fall/trauma/injury. Lasix gtt D/C today.  Heparin will be stopped at 0600 per MD order for RHC.   Denies CP, SOB, or pain/discomfort. All questions addressed. Will continue to monitor.

## 2018-08-23 NOTE — BRIEF OP NOTE
Date of admit to cath lab: 8/23/2018      Date of discharge from cath lab: 8/23/2018      Principal diagnosis: CHF    Discharge attending physician: Rubi Graham MD    Hospital Course/Outcome of the treatment, procedures or surgery: Pt admitted for RHC.   See CVIS/cath lab procedure report in EPIC  for full report of today's procedure.    Disposition of the case (d/c disposition): CSU    Discharge Medication List: see med card    Plan for follow up care, diet, activity level: F/U as scheduled. Resume low Na diet.  Activity as tolerated    Condition on discharge from Cath lab: Stable.

## 2018-08-23 NOTE — ASSESSMENT & PLAN NOTE
-Pt currently smoking 1.5 PPD for the past 50 years.  -Continue Nicotine patch  -Resume Inhalers Breo, Albuterol  -O2 weaned off.   -IS / AF

## 2018-08-24 LAB
ALBUMIN SERPL BCP-MCNC: 3.4 G/DL
ALP SERPL-CCNC: 82 U/L
ALT SERPL W/O P-5'-P-CCNC: 114 U/L
ANION GAP SERPL CALC-SCNC: 7 MMOL/L
ANION GAP SERPL CALC-SCNC: 7 MMOL/L
APTT BLDCRRT: 24.9 SEC
AST SERPL-CCNC: 25 U/L
BILIRUB SERPL-MCNC: 1.1 MG/DL
BUN SERPL-MCNC: 26 MG/DL
BUN SERPL-MCNC: 29 MG/DL
CALCIUM SERPL-MCNC: 9.6 MG/DL
CALCIUM SERPL-MCNC: 9.8 MG/DL
CHLORIDE SERPL-SCNC: 93 MMOL/L
CHLORIDE SERPL-SCNC: 95 MMOL/L
CO2 SERPL-SCNC: 33 MMOL/L
CO2 SERPL-SCNC: 34 MMOL/L
CREAT SERPL-MCNC: 1.4 MG/DL
CREAT SERPL-MCNC: 1.5 MG/DL
EST. GFR  (AFRICAN AMERICAN): 58.9 ML/MIN/1.73 M^2
EST. GFR  (AFRICAN AMERICAN): >60 ML/MIN/1.73 M^2
EST. GFR  (NON AFRICAN AMERICAN): 50.9 ML/MIN/1.73 M^2
EST. GFR  (NON AFRICAN AMERICAN): 55.4 ML/MIN/1.73 M^2
GLUCOSE SERPL-MCNC: 109 MG/DL
GLUCOSE SERPL-MCNC: 96 MG/DL
INR PPP: 1
MAGNESIUM SERPL-MCNC: 2.4 MG/DL
POCT GLUCOSE: 119 MG/DL (ref 70–110)
POCT GLUCOSE: 133 MG/DL (ref 70–110)
POCT GLUCOSE: 133 MG/DL (ref 70–110)
POCT GLUCOSE: 154 MG/DL (ref 70–110)
POCT GLUCOSE: 168 MG/DL (ref 70–110)
POCT GLUCOSE: 253 MG/DL (ref 70–110)
POTASSIUM SERPL-SCNC: 5.1 MMOL/L
POTASSIUM SERPL-SCNC: 5.5 MMOL/L
PROT SERPL-MCNC: 6.7 G/DL
PROTHROMBIN TIME: 10.9 SEC
SODIUM SERPL-SCNC: 133 MMOL/L
SODIUM SERPL-SCNC: 136 MMOL/L

## 2018-08-24 PROCEDURE — 25000003 PHARM REV CODE 250: Performed by: NURSE PRACTITIONER

## 2018-08-24 PROCEDURE — 99233 SBSQ HOSP IP/OBS HIGH 50: CPT | Mod: ,,, | Performed by: INTERNAL MEDICINE

## 2018-08-24 PROCEDURE — A4216 STERILE WATER/SALINE, 10 ML: HCPCS | Performed by: STUDENT IN AN ORGANIZED HEALTH CARE EDUCATION/TRAINING PROGRAM

## 2018-08-24 PROCEDURE — 36415 COLL VENOUS BLD VENIPUNCTURE: CPT

## 2018-08-24 PROCEDURE — 25000003 PHARM REV CODE 250: Performed by: STUDENT IN AN ORGANIZED HEALTH CARE EDUCATION/TRAINING PROGRAM

## 2018-08-24 PROCEDURE — 80053 COMPREHEN METABOLIC PANEL: CPT

## 2018-08-24 PROCEDURE — S4991 NICOTINE PATCH NONLEGEND: HCPCS | Performed by: STUDENT IN AN ORGANIZED HEALTH CARE EDUCATION/TRAINING PROGRAM

## 2018-08-24 PROCEDURE — 85610 PROTHROMBIN TIME: CPT

## 2018-08-24 PROCEDURE — 80048 BASIC METABOLIC PNL TOTAL CA: CPT

## 2018-08-24 PROCEDURE — 83735 ASSAY OF MAGNESIUM: CPT

## 2018-08-24 PROCEDURE — 20600001 HC STEP DOWN PRIVATE ROOM

## 2018-08-24 PROCEDURE — 85730 THROMBOPLASTIN TIME PARTIAL: CPT

## 2018-08-24 PROCEDURE — 63600175 PHARM REV CODE 636 W HCPCS: Performed by: STUDENT IN AN ORGANIZED HEALTH CARE EDUCATION/TRAINING PROGRAM

## 2018-08-24 RX ORDER — LISINOPRIL 2.5 MG/1
2.5 TABLET ORAL 2 TIMES DAILY
Status: DISCONTINUED | OUTPATIENT
Start: 2018-08-24 | End: 2018-08-28

## 2018-08-24 RX ORDER — DOXAZOSIN 1 MG/1
1 TABLET ORAL DAILY
Status: DISCONTINUED | OUTPATIENT
Start: 2018-08-24 | End: 2018-08-29

## 2018-08-24 RX ORDER — FUROSEMIDE 40 MG/1
40 TABLET ORAL 2 TIMES DAILY
Status: DISCONTINUED | OUTPATIENT
Start: 2018-08-24 | End: 2018-08-26

## 2018-08-24 RX ADMIN — MAGNESIUM OXIDE TAB 400 MG (241.3 MG ELEMENTAL MG) 400 MG: 400 (241.3 MG) TAB at 08:08

## 2018-08-24 RX ADMIN — AMIODARONE HYDROCHLORIDE 400 MG: 200 TABLET ORAL at 08:08

## 2018-08-24 RX ADMIN — FUROSEMIDE 40 MG: 40 TABLET ORAL at 08:08

## 2018-08-24 RX ADMIN — LISINOPRIL 2.5 MG: 2.5 TABLET ORAL at 08:08

## 2018-08-24 RX ADMIN — PANTOPRAZOLE SODIUM 40 MG: 40 TABLET, DELAYED RELEASE ORAL at 08:08

## 2018-08-24 RX ADMIN — FUROSEMIDE 40 MG: 40 TABLET ORAL at 05:08

## 2018-08-24 RX ADMIN — INSULIN ASPART 1 UNITS: 100 INJECTION, SOLUTION INTRAVENOUS; SUBCUTANEOUS at 08:08

## 2018-08-24 RX ADMIN — NICOTINE 1 PATCH: 21 PATCH, EXTENDED RELEASE TRANSDERMAL at 08:08

## 2018-08-24 RX ADMIN — Medication 3 ML: at 05:08

## 2018-08-24 RX ADMIN — FLUTICASONE FUROATE AND VILANTEROL TRIFENATATE 1 PUFF: 200; 25 POWDER RESPIRATORY (INHALATION) at 08:08

## 2018-08-24 RX ADMIN — Medication 3 ML: at 08:08

## 2018-08-24 RX ADMIN — DOXAZOSIN 1 MG: 1 TABLET ORAL at 11:08

## 2018-08-24 NOTE — ASSESSMENT & PLAN NOTE
-Diuresed extremely well since admit.  -RHC as above.   -Will restart PO Lasix today.  -Holding ACEI/AA in light of HARPAL. Can restart ACEI tomorrow if renal function continues to improve.   -NYHA class III symptoms. Had multiple admissions recently. Remains at high risk for HF readmissions.   -Had a long conversation with him about the risks and benefits of the Cardiomems device in view of his NYHA class III symptoms and recent HF admission. He is interested.   -Ambulate as tolerated, PT/OT.

## 2018-08-24 NOTE — PHYSICIAN QUERY
PT Name: Colton Harley  MR #: [id number]    Physician Query Form - Atrial Fibrillation Specificity     CDS/: Pavithra Babin RN, CCDS               Contact information: [email or web address]     This form is a permanent document in the medical record.     Query Date: August 24, 2018    By submitting this query, we are merely seeking further clarification of documentation. Please utilize your independent clinical judgment when addressing the question(s) below.    The medical record contains the following:   Indicators     Supporting Clinical Findings Location in Medical Record   X Atrial Fibrillation recurrent VT/Afib/AFL.  Xarelto last dose 08/19/19 0800 for Afib/ AFL.    Pt with episode of Atrial fibrillation on Dobutamine resulting in inappropriate therapy. 8/20 h/p  8/20 h/p, 8/21- 8/24 notes    8/21 consult   X EKG results Currently in NSR on EKG, hx of AFL / Afib on Xarelto  8/20 h/p   X Medication Recommend IV amiodarone for 24 hours, then continue PO amiodarone while on Dobutamine.    . IV amio load completed. Continue PO load(400mg BID for 2 weeks then decrease to 200mg daily).  8/21 consult      8/22- 8/24 notes          Treatment      Other         Provider, please further specify the Atrial Fibrillation diagnosis.    [  ] Chronic  [x  ] Paroxysmal  [  ] Permanent  [  ] Persistent  [  ] Other (please specify): ____________________________  [  ] Clinically Undetermined    Please document in your progress notes daily for the duration of treatment until resolved, and include in your discharge summary.

## 2018-08-24 NOTE — PROGRESS NOTES
"Update:    SW to pt's room for update and to obtain further information on pt's social support and substance use history. Pt aaox4, calm, and communicative. Pt spoke to SW at length about his family dinamics. Pt reports his dtr is very supportive, however she probably would not be able to stay at [facility] with pt for any extended period of time. Pt reports none of his family members have cars. Pt reports his son in law lives in pt's home, however he cannot provide any support to pt because he is a meth addict. Pt reports his son cannot provide much support because he "is special ed and disabled."     Pt talked at length about the relationship he has with his now  with. Pt reports experiencing severe depression and grief after the loss of his wife. Pt reports seeing multiple psychiatrists after his wife , but he did not find counseling or medication helpful. Pt reports a friend offered him crack for his depression about 1 year after the death of his wife. Pt reports he used crack for approximately 6 months and then "decided to stop." Pt reports no history of drug use prior to using crack for this time period.     Pt reports transportation difficulties, and states he will need transportation arranged through his [place] medicare plan at D/C. Pt does not know the correct phone number to call to arrange transportation, and reports his daughter can provide this number.     SW providing ongoing psychosocial and counseling support, education, assistance, resources, and discharge planning as indicated. SW continuing to follow and remains available.      "

## 2018-08-24 NOTE — ASSESSMENT & PLAN NOTE
- Improving since stopping Lasix/ACEI/AA.  - Likely secondary to overdiuresis.   - Holding ACEI/AA.   - RHC as above.  - Hold Nephrotoxic medications, no Contrast, Keep normotensive and euvolemic

## 2018-08-24 NOTE — PROGRESS NOTES
[facility]  Heart Transplant  Progress Note    Patient Name: Colton Harley  MRN: [id number]  Admission Date: 8/19/2018  Hospital Length of Stay: 5 days  Attending Physician: Reba Brewster MD  Primary Care Provider: Primary Doctor No  Principal Problem:Acute on chronic combined systolic and diastolic heart failure    Subjective:     Interval History: No complaints this am. Feeling pretty well.     Scheduled Meds:   amiodarone  400 mg Oral BID    fluticasone-vilanterol  1 puff Inhalation Daily    furosemide  40 mg Oral BID    magnesium oxide  400 mg Oral BID    nicotine  1 patch Transdermal Q24H    pantoprazole  40 mg Oral Daily    polyethylene glycol  17 g Oral Daily    sodium chloride 0.9%  3 mL Intravenous Q8H     PRN Meds:acetaminophen, albuterol, dextrose 50%, glucagon (human recombinant), insulin aspart U-100, ondansetron    Review of patient's allergies indicates:   Allergen Reactions    Buspirone Palpitations     Objective:     Vital Signs (Most Recent):  Temp: 97.6 °F (36.4 °C) (08/24/18 0729)  Pulse: 87 (08/24/18 0729)  Resp: 16 (08/24/18 0729)  BP: 94/61 (08/24/18 0729)  SpO2: 95 % (08/24/18 0729) Vital Signs (24h Range):  Temp:  [96 °F (35.6 °C)-98.1 °F (36.7 °C)] 97.6 °F (36.4 °C)  Pulse:  [82-99] 87  Resp:  [16-18] 16  SpO2:  [92 %-97 %] 95 %  BP: ()/(61-67) 94/61     Patient Vitals for the past 72 hrs (Last 3 readings):   Weight   08/24/18 0729 66.9 kg (147 lb 7.8 oz)   08/23/18 0457 65.5 kg (144 lb 6.4 oz)   08/22/18 0721 64.5 kg (142 lb 3.2 oz)     Body mass index is 23.1 kg/m².    Intake/Output Summary (Last 24 hours) at 8/24/2018 0749  Last data filed at 8/24/2018 0400  Gross per 24 hour   Intake 486 ml   Output 1600 ml   Net -1114 ml        Telemetry: nsr    Physical Exam   Constitutional: He is oriented to person, place, and time. He appears well-developed and well-nourished.   HENT:   Head: Normocephalic.   Eyes: Pupils are equal, round, and reactive to light.    Neck: Normal range of motion. Neck supple.   Cardiovascular: Normal rate and regular rhythm.   Murmur heard.  Pulmonary/Chest: Effort normal and breath sounds normal.   Abdominal: Soft. Bowel sounds are normal.   Musculoskeletal: Normal range of motion.   Neurological: He is alert and oriented to person, place, and time.   Skin: Skin is warm and dry.   Psychiatric: He has a normal mood and affect. His behavior is normal.   Nursing note and vitals reviewed.    Significant Labs:  CBC:  Recent Labs   Lab  08/20/18   0046  08/21/18   0316  08/23/18   0718   WBC  8.48  8.48  7.30  7.46   RBC  4.43*  4.43*  4.31*  4.65   HGB  12.6*  12.6*  12.7*  13.1*   HCT  39.4*  39.4*  37.9*  40.8   PLT  81*  81*  90*  145*   MCV  89  89  88  88   MCH  28.4  28.4  29.5  28.2   MCHC  32.0  32.0  33.5  32.1     BNP:  Recent Labs   Lab  08/20/18   0046   BNP  1,860*     CMP:  Recent Labs   Lab  08/22/18   0434  08/23/18   0518  08/24/18   0549   GLU  111*  124*  109   CALCIUM  9.5  9.4  9.6   ALBUMIN  3.5  3.4*  3.4*   PROT  6.7  6.6  6.7   NA  137  134*  136   K  3.7  5.1  5.5*   CO2  38*  35*  34*   CL  88*  90*  95   BUN  22*  36*  29*   CREATININE  1.3  2.3*  1.5*   ALKPHOS  98  89  82   ALT  199*  144*  114*   AST  25  21  25   BILITOT  1.3*  1.1*  1.1*      Coagulation:   Recent Labs   Lab  08/22/18   0434  08/22/18   1246  08/23/18   0518  08/24/18   0549   INR  1.2   --   1.1  1.0   APTT  72.4*  72.4*  45.3*  54.4*  54.4*  54.4*  24.9     LDH:  No results for input(s): LDH in the last 72 hours.  Microbiology:  Microbiology Results (last 7 days)     ** No results found for the last 168 hours. **        I have reviewed all pertinent labs within the past 24 hours.    Estimated Creatinine Clearance: 50.8 mL/min (A) (based on SCr of 1.5 mg/dL (H)).    Diagnostic Results:  I have reviewed all pertinent imaging results/findings within the past 24 hours.     [facility] 8/23/18:  BP: 111/78  HR: 89  PW: 19/19 (19)  PA: 46/16  (27)  AO_SAT: 98  PA_SAT: 71  RV: 41/0  RVEDP: 8   RA: 11/12 (9)  FICKCI: 2.8500  FICKCO: 5.0200  PVR: 191.0000    Assessment and Plan:     Mr. Harley is a 58 yoM, admitted to hospitals service with acute decompensated HF and LVAD work up. 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 DC-ICD (08/11/2018), ARI thrombus, COPD with Former smoker 1.5ppd x 50 years (quit 10 days ago), NIDDM type II, Hypertension, HLD, SVT, PE on Xarelto, MARK not on Cpap, GERD, cocaine use, schizophrenia, medication noncompliance. Admitted at [facility] from 8/14/18- 8/19/18 with cardiogenic shock leading to HARPAL, elevated LFTs, recurrent VT/Afib/AFL. Initially p/w for ICD placement, which was uncomplicated and d/c home following day. Day later p/w increase SOB, acute CHF and tachycardiac, his metoprolol and sotalol was increased and again discharged home following day. That evening he started felling weak with chills, presented to ER and round to have HARPAL, elevated LFTs, cardiogenic shock SBP 60-80s. Started on Dobutamine ggt and admitted to ICU, course of his admission dobutamine was weaned off with initiation of metoprolol lead to symptomatic hypotension. Interrogation of his ICD per report noted SVT in 230s. Patient seen by Cardiology and recommend further evaluation at [facility] for LVAD, poor prognosis with NYHA III/IV with significant MCCLAIN/taxing with minimal activity and further leads to SVT with subsequent shocks. Also seen by GI for elevated LFTs, likely d/t passive congestive hepatopathy, recommended to optimize HF.     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 He was first dx of HF at [facility] 01/15/18 presented with AECHF and LLL PNA. Had a LHC completed which noted non-obstructive CAD (no reports in file of this). Follows up with  Cardiology Dr. [place] in [place]. Xarelto last dose 08/19/19 0800 for Afib/ AFL. Since ICD placement it has fired x1 two days ago, [employer] interrogation on 08/18/18 revealed Mode VVI, HR 40BPM, VT-1 at 166NPM to monitor, VT-2 181BPM with ATP x3 followed by DCCV, VF 230BPM ATP x1 followed by DCCV (VT rate upto 230BPM into VF zone with failure of ATP x1 and subsequent 30j dccv on 8/18/18 at 11:32am)       Work up at [facility]:   RUQ us 8/15/18 consistent with some degree of cirrhosis with portal HtN.   Cr 0.9, ALB 3.1, T bili 1.3, AST//614, CPK 63, Mag 1.9, Digoxcin 0.87, INR 3.9, Troponin 0.04, BNP 1684, Hep C / Hep B negative,   Medications held Digoxin 0.25mg, Sotalol 80mg BID, Lipitor 40mg, Doxazosin 4mg,       EKG: ST with frequent PVCs, , RBBB with , Left axis deviation, inferior lead Q-waves noted,     * Acute on chronic combined systolic and diastolic heart failure    -Diuresed extremely well since admit.  -RHC as above.   -Will restart PO Lasix today.  -Holding ACEI/AA in light of HARPAL. Can restart ACEI tomorrow if renal function continues to improve.   -NYHA class III symptoms. Had multiple admissions recently. Remains at high risk for HF readmissions.   -Had a long conversation with him about the risks and benefits of the Cardiomems device in view of his NYHA class III symptoms and recent HF admission. He is interested.   -Ambulate as tolerated, PT/OT.        HARPAL (acute kidney injury)    - Improving since stopping Lasix/ACEI/AA.  - Likely secondary to overdiuresis.   - Holding ACEI/AA.   - RHC as above.  - Hold Nephrotoxic medications, no Contrast, Keep normotensive and euvolemic          Elevated LFTs    - Improving with diuresis. In the setting of congestive hepatopathy, seen by GI OSH.   - CT with normal liver attenuation  - Hep B/C non reactive.  - Holding statin therapy.         GERD (gastroesophageal reflux disease)    - continue PPI         A-fib    - Currently in NSR on  EKG, hx of AFL / Afib on Xarelto.  - Maintain K > 4, Mag > 2.  - Will restart NOAC.  - Appreciate EP help. IV amio load completed. Continue PO load(400mg BID for 2 weeks then decrease to 200mg daily).         V-tach    -S/p Fortify [employer] DC-ICD. VR 1357-40Q ICD by Dr. Johnson  On 08/10/18        Pulmonary emphysema    -Pt currently smoking 1.5 PPD for the past 50 years.  -Continue Nicotine patch  -Resume Inhalers Breo, Albuterol  -O2 weaned off.   -IS / AF            Best Solorzano, NP  Heart Transplant  [facility]

## 2018-08-24 NOTE — SUBJECTIVE & OBJECTIVE
Interval History: No complaints this am. Feeling pretty well.     Scheduled Meds:   amiodarone  400 mg Oral BID    fluticasone-vilanterol  1 puff Inhalation Daily    furosemide  40 mg Oral BID    magnesium oxide  400 mg Oral BID    nicotine  1 patch Transdermal Q24H    pantoprazole  40 mg Oral Daily    polyethylene glycol  17 g Oral Daily    sodium chloride 0.9%  3 mL Intravenous Q8H     PRN Meds:acetaminophen, albuterol, dextrose 50%, glucagon (human recombinant), insulin aspart U-100, ondansetron    Review of patient's allergies indicates:   Allergen Reactions    Buspirone Palpitations     Objective:     Vital Signs (Most Recent):  Temp: 97.6 °F (36.4 °C) (08/24/18 0729)  Pulse: 87 (08/24/18 0729)  Resp: 16 (08/24/18 0729)  BP: 94/61 (08/24/18 0729)  SpO2: 95 % (08/24/18 0729) Vital Signs (24h Range):  Temp:  [96 °F (35.6 °C)-98.1 °F (36.7 °C)] 97.6 °F (36.4 °C)  Pulse:  [82-99] 87  Resp:  [16-18] 16  SpO2:  [92 %-97 %] 95 %  BP: ()/(61-67) 94/61     Patient Vitals for the past 72 hrs (Last 3 readings):   Weight   08/24/18 0729 66.9 kg (147 lb 7.8 oz)   08/23/18 0457 65.5 kg (144 lb 6.4 oz)   08/22/18 0721 64.5 kg (142 lb 3.2 oz)     Body mass index is 23.1 kg/m².    Intake/Output Summary (Last 24 hours) at 8/24/2018 0749  Last data filed at 8/24/2018 0400  Gross per 24 hour   Intake 486 ml   Output 1600 ml   Net -1114 ml        Telemetry: nsr    Physical Exam   Constitutional: He is oriented to person, place, and time. He appears well-developed and well-nourished.   HENT:   Head: Normocephalic.   Eyes: Pupils are equal, round, and reactive to light.   Neck: Normal range of motion. Neck supple.   Cardiovascular: Normal rate and regular rhythm.   Murmur heard.  Pulmonary/Chest: Effort normal and breath sounds normal.   Abdominal: Soft. Bowel sounds are normal.   Musculoskeletal: Normal range of motion.   Neurological: He is alert and oriented to person, place, and time.   Skin: Skin is warm and dry.    Psychiatric: He has a normal mood and affect. His behavior is normal.   Nursing note and vitals reviewed.    Significant Labs:  CBC:  Recent Labs   Lab  08/20/18   0046  08/21/18   0316  08/23/18   0718   WBC  8.48  8.48  7.30  7.46   RBC  4.43*  4.43*  4.31*  4.65   HGB  12.6*  12.6*  12.7*  13.1*   HCT  39.4*  39.4*  37.9*  40.8   PLT  81*  81*  90*  145*   MCV  89  89  88  88   MCH  28.4  28.4  29.5  28.2   MCHC  32.0  32.0  33.5  32.1     BNP:  Recent Labs   Lab  08/20/18 0046   BNP  1,860*     CMP:  Recent Labs   Lab  08/22/18   0434  08/23/18   0518  08/24/18   0549   GLU  111*  124*  109   CALCIUM  9.5  9.4  9.6   ALBUMIN  3.5  3.4*  3.4*   PROT  6.7  6.6  6.7   NA  137  134*  136   K  3.7  5.1  5.5*   CO2  38*  35*  34*   CL  88*  90*  95   BUN  22*  36*  29*   CREATININE  1.3  2.3*  1.5*   ALKPHOS  98  89  82   ALT  199*  144*  114*   AST  25  21  25   BILITOT  1.3*  1.1*  1.1*      Coagulation:   Recent Labs   Lab  08/22/18 0434  08/22/18   1246  08/23/18   0518  08/24/18   0549   INR  1.2   --   1.1  1.0   APTT  72.4*  72.4*  45.3*  54.4*  54.4*  54.4*  24.9     LDH:  No results for input(s): LDH in the last 72 hours.  Microbiology:  Microbiology Results (last 7 days)     ** No results found for the last 168 hours. **        I have reviewed all pertinent labs within the past 24 hours.    Estimated Creatinine Clearance: 50.8 mL/min (A) (based on SCr of 1.5 mg/dL (H)).    Diagnostic Results:  I have reviewed all pertinent imaging results/findings within the past 24 hours.     [facility] 8/23/18:  BP: 111/78  HR: 89  PW: 19/19 (19)  PA: 46/16 (27)  AO_SAT: 98  PA_SAT: 71  RV: 41/0  RVEDP: 8   RA: 11/12 (9)  FICKCI: 2.8500  FICKCO: 5.0200  PVR: 191.0000

## 2018-08-24 NOTE — PHYSICIAN QUERY
PT Name: Colton Harley  MR #: [id number]    Physician Query Form - CardioPulmonary Clarification      CDS/: Pavithra Babin RN, CCDS             Contact information: [email or web address]    This form is a permanent document in the medical record.    Query Date: August 24, 2018    By submitting this query, we are merely seeking further clarification of documentation. Please utilize your independent clinical judgment when addressing the question(s) below.    The Medical record contains the following:   Indicators   Supporting Clinical Findings Location in Medical Record   X Pulmonary Hypertension documented Mild pulmonary hypertension 8/23 [facility]   X Acute/Chronic Illness  acute decompensated HF and LVAD work up. PMhx NICM/HPrEF (LVef 10-15%)     8/20 h/p   X Echo and/or Heart Cath Findings CONCLUSIONS     1 - Severely depressed left ventricular systolic function (EF 15-20%).     2 - Right ventricular enlargement with severely depressed systolic function.     3 - Restrictive LV filling pattern, indicating markedly elevated LAP (grade 3 diastolic dysfunction).     4 - Right atrial enlargement.     5 - Moderate tricuspid regurgitation.     6 - Mild mitral regurgitation.     7 - Pulmonary hypertension. The estimated PA systolic pressure is 51 mmHg.     8 - Increased central venous pressure. 8/20 echo    BiPAP/Intubation/Supplemental O2      SOB, MCCLAIN, Fatigue, Dizziness, LE Edema, Cyanosis, Chest Pain, Respiratory Distress, Hypoxia, etc.      Treatment         Medication      Other     Provider, please specify the type of pulmonary hypertension:    [   ]  Group 1:  Pulmonary Arterial Hypertension - includes Primary, Idiopathic, Inheritable, and Secondary (due to drugs, toxins, congenital heart diease, HIV infection, etc.)    [  x ]  Group 2:  Pulmonary Hypertension due to Left Heart Disease, including left heart failure and/or left heart valve disease    [   ]  Group 5:  Pulmonary Hypertension due to other,  multifactorial, or unclear mechanisms    [   ]  Pulmonary Hypertension, unspecified    [   ]  Other Cardiopulmonary Condition (please specify):  _____________________________________    [   ]  Clinically Undetermined    Please document in your progress notes daily for the duration of treatment, until resolved, and include in your discharge summary.

## 2018-08-24 NOTE — ASSESSMENT & PLAN NOTE
- Currently in NSR on EKG, hx of AFL / Afib on Xarelto.  - Maintain K > 4, Mag > 2.  - Will restart NOAC.  - Appreciate EP help. IV amio load completed. Continue PO load(400mg BID for 2 weeks then decrease to 200mg daily).

## 2018-08-24 NOTE — PLAN OF CARE
Problem: Patient Care Overview  Goal: Plan of Care Review  Outcome: Revised  Plan of care discussed with patient. Patient is free of fall/trauma/injury. Denies CP, SOB, or pain/discomfort. Initiate PO lasix 40 mg bid. Low potassium diet in place. All questions addressed. Will continue to monitor

## 2018-08-24 NOTE — PLAN OF CARE
Problem: Patient Care Overview  Goal: Plan of Care Review  Outcome: Ongoing (interventions implemented as appropriate)  Plan of care reviewed with patient. Patient states understanding. Right heart cath completed with results showing mild Pulmonary HTN. Patient remains free from injury. No acute events noted at this time. Vital signs stable. Call bell in reach. Bed locked and in lowest position. Will continue to monitor patient.

## 2018-08-25 LAB
ALBUMIN SERPL BCP-MCNC: 3.4 G/DL
ALP SERPL-CCNC: 77 U/L
ALT SERPL W/O P-5'-P-CCNC: 101 U/L
ANION GAP SERPL CALC-SCNC: 9 MMOL/L
APTT BLDCRRT: 25.2 SEC
AST SERPL-CCNC: 33 U/L
BILIRUB SERPL-MCNC: 1.2 MG/DL
BUN SERPL-MCNC: 31 MG/DL
CALCIUM SERPL-MCNC: 9.3 MG/DL
CHLORIDE SERPL-SCNC: 99 MMOL/L
CO2 SERPL-SCNC: 29 MMOL/L
CREAT SERPL-MCNC: 1.3 MG/DL
EST. GFR  (AFRICAN AMERICAN): >60 ML/MIN/1.73 M^2
EST. GFR  (NON AFRICAN AMERICAN): >60 ML/MIN/1.73 M^2
GLUCOSE SERPL-MCNC: 118 MG/DL
INR PPP: 1
MAGNESIUM SERPL-MCNC: 2 MG/DL
POCT GLUCOSE: 121 MG/DL (ref 70–110)
POCT GLUCOSE: 135 MG/DL (ref 70–110)
POCT GLUCOSE: 203 MG/DL (ref 70–110)
POCT GLUCOSE: 88 MG/DL (ref 70–110)
POTASSIUM SERPL-SCNC: 4.6 MMOL/L
PROT SERPL-MCNC: 6.4 G/DL
PROTHROMBIN TIME: 10.6 SEC
SODIUM SERPL-SCNC: 137 MMOL/L

## 2018-08-25 PROCEDURE — 85730 THROMBOPLASTIN TIME PARTIAL: CPT

## 2018-08-25 PROCEDURE — 20600001 HC STEP DOWN PRIVATE ROOM

## 2018-08-25 PROCEDURE — 80053 COMPREHEN METABOLIC PANEL: CPT

## 2018-08-25 PROCEDURE — 94761 N-INVAS EAR/PLS OXIMETRY MLT: CPT

## 2018-08-25 PROCEDURE — A4216 STERILE WATER/SALINE, 10 ML: HCPCS | Performed by: STUDENT IN AN ORGANIZED HEALTH CARE EDUCATION/TRAINING PROGRAM

## 2018-08-25 PROCEDURE — S4991 NICOTINE PATCH NONLEGEND: HCPCS | Performed by: STUDENT IN AN ORGANIZED HEALTH CARE EDUCATION/TRAINING PROGRAM

## 2018-08-25 PROCEDURE — 83735 ASSAY OF MAGNESIUM: CPT

## 2018-08-25 PROCEDURE — 85610 PROTHROMBIN TIME: CPT

## 2018-08-25 PROCEDURE — 25000003 PHARM REV CODE 250: Performed by: STUDENT IN AN ORGANIZED HEALTH CARE EDUCATION/TRAINING PROGRAM

## 2018-08-25 PROCEDURE — 25000003 PHARM REV CODE 250

## 2018-08-25 PROCEDURE — 99233 SBSQ HOSP IP/OBS HIGH 50: CPT | Mod: ,,, | Performed by: INTERNAL MEDICINE

## 2018-08-25 PROCEDURE — 36415 COLL VENOUS BLD VENIPUNCTURE: CPT

## 2018-08-25 PROCEDURE — 25000003 PHARM REV CODE 250: Performed by: NURSE PRACTITIONER

## 2018-08-25 RX ADMIN — FLUTICASONE FUROATE AND VILANTEROL TRIFENATATE 1 PUFF: 200; 25 POWDER RESPIRATORY (INHALATION) at 08:08

## 2018-08-25 RX ADMIN — RIVAROXABAN 20 MG: 20 TABLET, FILM COATED ORAL at 05:08

## 2018-08-25 RX ADMIN — FUROSEMIDE 40 MG: 40 TABLET ORAL at 08:08

## 2018-08-25 RX ADMIN — AMIODARONE HYDROCHLORIDE 400 MG: 200 TABLET ORAL at 08:08

## 2018-08-25 RX ADMIN — Medication 3 ML: at 12:08

## 2018-08-25 RX ADMIN — DOXAZOSIN 1 MG: 1 TABLET ORAL at 08:08

## 2018-08-25 RX ADMIN — LISINOPRIL 2.5 MG: 2.5 TABLET ORAL at 08:08

## 2018-08-25 RX ADMIN — INSULIN ASPART 2 UNITS: 100 INJECTION, SOLUTION INTRAVENOUS; SUBCUTANEOUS at 12:08

## 2018-08-25 RX ADMIN — FUROSEMIDE 40 MG: 40 TABLET ORAL at 05:08

## 2018-08-25 RX ADMIN — MAGNESIUM OXIDE TAB 400 MG (241.3 MG ELEMENTAL MG) 400 MG: 400 (241.3 MG) TAB at 08:08

## 2018-08-25 RX ADMIN — Medication 3 ML: at 09:08

## 2018-08-25 RX ADMIN — PANTOPRAZOLE SODIUM 40 MG: 40 TABLET, DELAYED RELEASE ORAL at 08:08

## 2018-08-25 RX ADMIN — NICOTINE 1 PATCH: 21 PATCH, EXTENDED RELEASE TRANSDERMAL at 09:08

## 2018-08-25 NOTE — ASSESSMENT & PLAN NOTE
- Improving since diuresis, held, no urinating well on PO dose.  - Continue low dose lisinopril at 2.5 BID and lasix 40mg PO BID  - Hold Nephrotoxic medications, no Contrast, Keep normotensive and euvolemic

## 2018-08-25 NOTE — ASSESSMENT & PLAN NOTE
- Currently in NSR on EKG, hx of AFL / Afib on Xarelto.  - Maintain K > 4, Mag > 2.  - Restarted Xarelto 20mg daily now that renal function improved.  - Appreciate EP help. IV amio load completed. Continue PO load(400mg BID for 2 weeks then decrease to 200mg daily).

## 2018-08-25 NOTE — SUBJECTIVE & OBJECTIVE
Interval History: No events overnight, feels well    Continuous Infusions:  Scheduled Meds:   amiodarone  400 mg Oral BID    doxazosin  1 mg Oral Daily    fluticasone-vilanterol  1 puff Inhalation Daily    furosemide  40 mg Oral BID    lisinopril  2.5 mg Oral BID    magnesium oxide  400 mg Oral BID    nicotine  1 patch Transdermal Q24H    pantoprazole  40 mg Oral Daily    polyethylene glycol  17 g Oral Daily    sodium chloride 0.9%  3 mL Intravenous Q8H     PRN Meds:acetaminophen, albuterol, dextrose 50%, glucagon (human recombinant), insulin aspart U-100, ondansetron    Review of patient's allergies indicates:   Allergen Reactions    Buspirone Palpitations     Objective:     Vital Signs (Most Recent):  Temp: 98 °F (36.7 °C) (08/24/18 2311)  Pulse: 85 (08/25/18 0325)  Resp: 20 (08/24/18 1925)  BP: 95/64 (08/24/18 2311)  SpO2: 96 % (08/24/18 2311) Vital Signs (24h Range):  Temp:  [97.6 °F (36.4 °C)-98.2 °F (36.8 °C)] 98 °F (36.7 °C)  Pulse:  [81-89] 85  Resp:  [16-20] 20  SpO2:  [93 %-99 %] 96 %  BP: ()/(61-74) 95/64     Patient Vitals for the past 72 hrs (Last 3 readings):   Weight   08/24/18 0729 66.9 kg (147 lb 7.8 oz)   08/23/18 0457 65.5 kg (144 lb 6.4 oz)   08/22/18 0721 64.5 kg (142 lb 3.2 oz)     Body mass index is 23.1 kg/m².      Intake/Output Summary (Last 24 hours) at 8/25/2018 0431  Last data filed at 8/25/2018 0000  Gross per 24 hour   Intake 720 ml   Output 3225 ml   Net -2505 ml       Hemodynamic Parameters:       Telemetry: NSR, no events    Physical Exam   Constitutional: He is oriented to person, place, and time. He appears well-developed and well-nourished.   HENT:   Head: Normocephalic.   Eyes: Pupils are equal, round, and reactive to light.   Neck: Normal range of motion. Neck supple.   Cardiovascular: Normal rate and regular rhythm.   Murmur heard.  Pulmonary/Chest: Effort normal and breath sounds normal.   Abdominal: Soft. Bowel sounds are normal.   Musculoskeletal: Normal  range of motion.   Neurological: He is alert and oriented to person, place, and time.   Skin: Skin is warm and dry.   Psychiatric: He has a normal mood and affect. His behavior is normal.   Nursing note and vitals reviewed.      Significant Labs:  CBC:  Recent Labs   Lab  08/20/18   0046  08/21/18   0316  08/23/18   0718   WBC  8.48  8.48  7.30  7.46   RBC  4.43*  4.43*  4.31*  4.65   HGB  12.6*  12.6*  12.7*  13.1*   HCT  39.4*  39.4*  37.9*  40.8   PLT  81*  81*  90*  145*   MCV  89  89  88  88   MCH  28.4  28.4  29.5  28.2   MCHC  32.0  32.0  33.5  32.1     BNP:  Recent Labs   Lab  08/20/18   0046   BNP  1,860*     CMP:  Recent Labs   Lab  08/22/18   0434  08/23/18   0518  08/24/18   0549  08/24/18   1221   GLU  111*  124*  109  96   CALCIUM  9.5  9.4  9.6  9.8   ALBUMIN  3.5  3.4*  3.4*   --    PROT  6.7  6.6  6.7   --    NA  137  134*  136  133*   K  3.7  5.1  5.5*  5.1   CO2  38*  35*  34*  33*   CL  88*  90*  95  93*   BUN  22*  36*  29*  26*   CREATININE  1.3  2.3*  1.5*  1.4   ALKPHOS  98  89  82   --    ALT  199*  144*  114*   --    AST  25  21  25   --    BILITOT  1.3*  1.1*  1.1*   --       Coagulation:   Recent Labs   Lab  08/22/18   0434  08/22/18   1246  08/23/18   0518  08/24/18   0549   INR  1.2   --   1.1  1.0   APTT  72.4*  72.4*  45.3*  54.4*  54.4*  54.4*  24.9     LDH:  No results for input(s): LDH in the last 72 hours.  Microbiology:  Microbiology Results (last 7 days)     ** No results found for the last 168 hours. **        I have reviewed all pertinent labs within the past 24 hours.    Estimated Creatinine Clearance: 54.4 mL/min (based on SCr of 1.4 mg/dL).

## 2018-08-25 NOTE — PLAN OF CARE
Problem: Patient Care Overview  Goal: Plan of Care Review  Outcome: Ongoing (interventions implemented as appropriate)  PT remains free of falls. No CO CP, SOB or Dizziness. Pt resting comfortably. Will continue to monitor.

## 2018-08-25 NOTE — ASSESSMENT & PLAN NOTE
-Diuresed extremely well since admit, RHC shows good volume off-loading and adequate CO:  PA: 46/16 (27), PW: 19/19 (19)  AO_SAT: 98 PA_SAT: 71  RV: 41/0, RVEDP: 8 , RA: 11/12 (9)  FICKCI: 2.8500  FICKCO: 5.0200  PVR: 191.0000.    -continue lasix 40mg PO BID  -continue lisinopril 2.5mg BID  -NYHA class III symptoms. Had multiple admissions recently. Remains at high risk for HF readmissions, he is interested in Cardiomems device in view of his NYHA class III symptoms and recent HF admission, but will need to follow up as outpatient once approved.  -Ambulate as tolerated, PT/OT.

## 2018-08-25 NOTE — PROGRESS NOTES
[facility]  Heart Transplant  Progress Note    Patient Name: Colton Harley  MRN: [id number]  Admission Date: 8/19/2018  Hospital Length of Stay: 6 days  Attending Physician: Reba Brewster MD  Primary Care Provider: Primary Doctor No  Principal Problem:Acute on chronic combined systolic and diastolic heart failure    Subjective:     Interval History: No events overnight, feels well    Continuous Infusions:  Scheduled Meds:   amiodarone  400 mg Oral BID    doxazosin  1 mg Oral Daily    fluticasone-vilanterol  1 puff Inhalation Daily    furosemide  40 mg Oral BID    lisinopril  2.5 mg Oral BID    magnesium oxide  400 mg Oral BID    nicotine  1 patch Transdermal Q24H    pantoprazole  40 mg Oral Daily    polyethylene glycol  17 g Oral Daily    sodium chloride 0.9%  3 mL Intravenous Q8H     PRN Meds:acetaminophen, albuterol, dextrose 50%, glucagon (human recombinant), insulin aspart U-100, ondansetron    Review of patient's allergies indicates:   Allergen Reactions    Buspirone Palpitations     Objective:     Vital Signs (Most Recent):  Temp: 98 °F (36.7 °C) (08/24/18 2311)  Pulse: 85 (08/25/18 0325)  Resp: 20 (08/24/18 1925)  BP: 95/64 (08/24/18 2311)  SpO2: 96 % (08/24/18 2311) Vital Signs (24h Range):  Temp:  [97.6 °F (36.4 °C)-98.2 °F (36.8 °C)] 98 °F (36.7 °C)  Pulse:  [81-89] 85  Resp:  [16-20] 20  SpO2:  [93 %-99 %] 96 %  BP: ()/(61-74) 95/64     Patient Vitals for the past 72 hrs (Last 3 readings):   Weight   08/24/18 0729 66.9 kg (147 lb 7.8 oz)   08/23/18 0457 65.5 kg (144 lb 6.4 oz)   08/22/18 0721 64.5 kg (142 lb 3.2 oz)     Body mass index is 23.1 kg/m².      Intake/Output Summary (Last 24 hours) at 8/25/2018 0431  Last data filed at 8/25/2018 0000  Gross per 24 hour   Intake 720 ml   Output 3225 ml   Net -2505 ml       Hemodynamic Parameters:       Telemetry: NSR, no events    Physical Exam   Constitutional: He is oriented to person, place, and time. He appears  well-developed and well-nourished.   HENT:   Head: Normocephalic.   Eyes: Pupils are equal, round, and reactive to light.   Neck: Normal range of motion. Neck supple.   Cardiovascular: Normal rate and regular rhythm.   Murmur heard.  Pulmonary/Chest: Effort normal and breath sounds normal.   Abdominal: Soft. Bowel sounds are normal.   Musculoskeletal: Normal range of motion.   Neurological: He is alert and oriented to person, place, and time.   Skin: Skin is warm and dry.   Psychiatric: He has a normal mood and affect. His behavior is normal.   Nursing note and vitals reviewed.      Significant Labs:  CBC:  Recent Labs   Lab  08/20/18   0046  08/21/18   0316  08/23/18   0718   WBC  8.48  8.48  7.30  7.46   RBC  4.43*  4.43*  4.31*  4.65   HGB  12.6*  12.6*  12.7*  13.1*   HCT  39.4*  39.4*  37.9*  40.8   PLT  81*  81*  90*  145*   MCV  89  89  88  88   MCH  28.4  28.4  29.5  28.2   MCHC  32.0  32.0  33.5  32.1     BNP:  Recent Labs   Lab  08/20/18   0046   BNP  1,860*     CMP:  Recent Labs   Lab  08/22/18 0434  08/23/18   0518  08/24/18   0549  08/24/18   1221   GLU  111*  124*  109  96   CALCIUM  9.5  9.4  9.6  9.8   ALBUMIN  3.5  3.4*  3.4*   --    PROT  6.7  6.6  6.7   --    NA  137  134*  136  133*   K  3.7  5.1  5.5*  5.1   CO2  38*  35*  34*  33*   CL  88*  90*  95  93*   BUN  22*  36*  29*  26*   CREATININE  1.3  2.3*  1.5*  1.4   ALKPHOS  98  89  82   --    ALT  199*  144*  114*   --    AST  25  21  25   --    BILITOT  1.3*  1.1*  1.1*   --       Coagulation:   Recent Labs   Lab  08/22/18   0434  08/22/18   1246  08/23/18   0518  08/24/18   0549   INR  1.2   --   1.1  1.0   APTT  72.4*  72.4*  45.3*  54.4*  54.4*  54.4*  24.9     LDH:  No results for input(s): LDH in the last 72 hours.  Microbiology:  Microbiology Results (last 7 days)     ** No results found for the last 168 hours. **        I have reviewed all pertinent labs within the past 24 hours.    Estimated Creatinine Clearance: 54.4  mL/min (based on SCr of 1.4 mg/dL).      Assessment and Plan:     Mr. Harley is a 58 yoM, admitted to [facility] service with acute decompensated HF and LVAD work up. 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 DC-ICD (08/11/2018), ARI thrombus, COPD with Former smoker 1.5ppd x 50 years (quit 10 days ago), NIDDM type II, Hypertension, HLD, SVT, PE on Xarelto, MARK not on Cpap, GERD, cocaine use, schizophrenia, medication noncompliance. Admitted at [facility] from 8/14/18- 8/19/18 with cardiogenic shock leading to HARPAL, elevated LFTs, recurrent VT/Afib/AFL. Initially p/w for ICD placement, which was uncomplicated and d/c home following day. Day later p/w increase SOB, acute CHF and tachycardiac, his metoprolol and sotalol was increased and again discharged home following day. That evening he started felling weak with chills, presented to ER and round to have HARPAL, elevated LFTs, cardiogenic shock SBP 60-80s. Started on Dobutamine ggt and admitted to ICU, course of his admission dobutamine was weaned off with initiation of metoprolol lead to symptomatic hypotension. Interrogation of his ICD per report noted SVT in 230s. Patient seen by Cardiology and recommend further evaluation at [facility] for LVAD, poor prognosis with NYHA III/IV with significant MCCLAIN/taxing with minimal activity and further leads to SVT with subsequent shocks. Also seen by GI for elevated LFTs, likely d/t passive congestive hepatopathy, recommended to optimize HF.     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 He was first dx of HF at [facility] 01/15/18 presented with AECHF and LLL PNA. Had a [facility] completed which noted non-obstructive CAD (no reports in file of this). Follows up with Cardiology Dr. Montana in [place]. Xarelto last dose 08/19/19 0800 for Afib/  AFL. Since ICD placement it has fired x1 two days ago, [employer] interrogation on 08/18/18 revealed Mode VVI, HR 40BPM, VT-1 at 166NPM to monitor, VT-2 181BPM with ATP x3 followed by DCCV, VF 230BPM ATP x1 followed by DCCV (VT rate upto 230BPM into VF zone with failure of ATP x1 and subsequent 30j dccv on 8/18/18 at 11:32am)       Work up at [facility]:   RUQ us 8/15/18 consistent with some degree of cirrhosis with portal HtN.   Cr 0.9, ALB 3.1, T bili 1.3, AST//614, CPK 63, Mag 1.9, Digoxcin 0.87, INR 3.9, Troponin 0.04, BNP 1684, Hep C / Hep B negative,   Medications held Digoxin 0.25mg, Sotalol 80mg BID, Lipitor 40mg, Doxazosin 4mg,       EKG: ST with frequent PVCs, , RBBB with , Left axis deviation, inferior lead Q-waves noted,     * Acute on chronic combined systolic and diastolic heart failure    -Diuresed extremely well since admit, RHC shows good volume off-loading and adequate CO:  PA: 46/16 (27), PW: 19/19 (19)  AO_SAT: 98 PA_SAT: 71  RV: 41/0, RVEDP: 8 , RA: 11/12 (9)  FICKCI: 2.8500  FICKCO: 5.0200  PVR: 191.0000.    -continue lasix 40mg PO BID  -continue lisinopril 2.5mg BID  -NYHA class III symptoms. Had multiple admissions recently. Remains at high risk for HF readmissions, he is interested in Cardiomems device in view of his NYHA class III symptoms and recent HF admission, but will need to follow up as outpatient once approved.  -Ambulate as tolerated, PT/OT.        Elevated LFTs    - Improving with diuresis. In the setting of congestive hepatopathy, seen by GI OSH.   - CT with normal liver attenuation  - Hep B/C non reactive.  - Holding statin therapy.         GERD (gastroesophageal reflux disease)    - continue PPI         A-fib    - Currently in NSR on EKG, hx of AFL / Afib on Xarelto.  - Maintain K > 4, Mag > 2.  - Restarted Xarelto 20mg daily now that renal function improved.  - Appreciate EP help. IV amio load completed. Continue PO load(400mg BID for 2 weeks then  decrease to 200mg daily).         V-tach    -S/p Fortify [employer] DC-ICD. VR 1357-40Q ICD by Dr. Johnson  On 08/10/18        Pulmonary emphysema    -Pt currently smoking 1.5 PPD for the past 50 years.  -Continue Nicotine patch  -Resume Inhalers Breo, Albuterol  -O2 weaned off.   -IS / AF        HARPAL (acute kidney injury)    - Improving since diuresis, held, no urinating well on PO dose.  - Continue low dose lisinopril at 2.5 BID and lasix 40mg PO BID  - Hold Nephrotoxic medications, no Contrast, Keep normotensive and euvolemic              Nilo Bolaños MD  Heart Transplant  [facility]

## 2018-08-26 LAB
ALBUMIN SERPL BCP-MCNC: 3.5 G/DL
ALP SERPL-CCNC: 77 U/L
ALT SERPL W/O P-5'-P-CCNC: 86 U/L
ANION GAP SERPL CALC-SCNC: 11 MMOL/L
APTT BLDCRRT: 32.7 SEC
AST SERPL-CCNC: 28 U/L
BILIRUB SERPL-MCNC: 0.9 MG/DL
BUN SERPL-MCNC: 38 MG/DL
CALCIUM SERPL-MCNC: 9.6 MG/DL
CHLORIDE SERPL-SCNC: 96 MMOL/L
CO2 SERPL-SCNC: 27 MMOL/L
CREAT SERPL-MCNC: 1.6 MG/DL
EST. GFR  (AFRICAN AMERICAN): 54.5 ML/MIN/1.73 M^2
EST. GFR  (NON AFRICAN AMERICAN): 47.1 ML/MIN/1.73 M^2
GLUCOSE SERPL-MCNC: 122 MG/DL
INR PPP: 1.3
MAGNESIUM SERPL-MCNC: 2 MG/DL
POCT GLUCOSE: 108 MG/DL (ref 70–110)
POCT GLUCOSE: 118 MG/DL (ref 70–110)
POCT GLUCOSE: 174 MG/DL (ref 70–110)
POCT GLUCOSE: 188 MG/DL (ref 70–110)
POTASSIUM SERPL-SCNC: 4.3 MMOL/L
PROT SERPL-MCNC: 6.5 G/DL
PROTHROMBIN TIME: 13 SEC
SODIUM SERPL-SCNC: 134 MMOL/L

## 2018-08-26 PROCEDURE — 85610 PROTHROMBIN TIME: CPT

## 2018-08-26 PROCEDURE — 99233 SBSQ HOSP IP/OBS HIGH 50: CPT | Mod: ,,, | Performed by: INTERNAL MEDICINE

## 2018-08-26 PROCEDURE — 36415 COLL VENOUS BLD VENIPUNCTURE: CPT

## 2018-08-26 PROCEDURE — S4991 NICOTINE PATCH NONLEGEND: HCPCS | Performed by: STUDENT IN AN ORGANIZED HEALTH CARE EDUCATION/TRAINING PROGRAM

## 2018-08-26 PROCEDURE — 25000003 PHARM REV CODE 250

## 2018-08-26 PROCEDURE — 25000003 PHARM REV CODE 250: Performed by: STUDENT IN AN ORGANIZED HEALTH CARE EDUCATION/TRAINING PROGRAM

## 2018-08-26 PROCEDURE — A4216 STERILE WATER/SALINE, 10 ML: HCPCS | Performed by: STUDENT IN AN ORGANIZED HEALTH CARE EDUCATION/TRAINING PROGRAM

## 2018-08-26 PROCEDURE — 25000003 PHARM REV CODE 250: Performed by: NURSE PRACTITIONER

## 2018-08-26 PROCEDURE — 20600001 HC STEP DOWN PRIVATE ROOM

## 2018-08-26 PROCEDURE — 83735 ASSAY OF MAGNESIUM: CPT

## 2018-08-26 PROCEDURE — 85730 THROMBOPLASTIN TIME PARTIAL: CPT

## 2018-08-26 PROCEDURE — 80053 COMPREHEN METABOLIC PANEL: CPT

## 2018-08-26 PROCEDURE — 94761 N-INVAS EAR/PLS OXIMETRY MLT: CPT

## 2018-08-26 RX ORDER — FUROSEMIDE 40 MG/1
40 TABLET ORAL DAILY
Status: DISCONTINUED | OUTPATIENT
Start: 2018-08-26 | End: 2018-08-27

## 2018-08-26 RX ORDER — FUROSEMIDE 40 MG/1
40 TABLET ORAL DAILY
Status: DISCONTINUED | OUTPATIENT
Start: 2018-08-27 | End: 2018-08-26

## 2018-08-26 RX ADMIN — DOXAZOSIN 1 MG: 1 TABLET ORAL at 08:08

## 2018-08-26 RX ADMIN — MAGNESIUM OXIDE TAB 400 MG (241.3 MG ELEMENTAL MG) 400 MG: 400 (241.3 MG) TAB at 07:08

## 2018-08-26 RX ADMIN — FLUTICASONE FUROATE AND VILANTEROL TRIFENATATE 1 PUFF: 200; 25 POWDER RESPIRATORY (INHALATION) at 08:08

## 2018-08-26 RX ADMIN — PANTOPRAZOLE SODIUM 40 MG: 40 TABLET, DELAYED RELEASE ORAL at 08:08

## 2018-08-26 RX ADMIN — LISINOPRIL 2.5 MG: 2.5 TABLET ORAL at 08:08

## 2018-08-26 RX ADMIN — FUROSEMIDE 40 MG: 40 TABLET ORAL at 05:08

## 2018-08-26 RX ADMIN — NICOTINE 1 PATCH: 21 PATCH, EXTENDED RELEASE TRANSDERMAL at 08:08

## 2018-08-26 RX ADMIN — AMIODARONE HYDROCHLORIDE 400 MG: 200 TABLET ORAL at 07:08

## 2018-08-26 RX ADMIN — Medication 3 ML: at 05:08

## 2018-08-26 RX ADMIN — LISINOPRIL 2.5 MG: 2.5 TABLET ORAL at 07:08

## 2018-08-26 RX ADMIN — Medication 3 ML: at 08:08

## 2018-08-26 RX ADMIN — RIVAROXABAN 20 MG: 20 TABLET, FILM COATED ORAL at 05:08

## 2018-08-26 RX ADMIN — AMIODARONE HYDROCHLORIDE 400 MG: 200 TABLET ORAL at 08:08

## 2018-08-26 RX ADMIN — MAGNESIUM OXIDE TAB 400 MG (241.3 MG ELEMENTAL MG) 400 MG: 400 (241.3 MG) TAB at 08:08

## 2018-08-26 NOTE — PLAN OF CARE
Problem: Patient Care Overview  Goal: Plan of Care Review  Plan of care discussed with patient. Patient is free of fall/trauma/injury. Denies CP, SOB, or pain/discomfort. All questions addressed. Will continue to monitor.

## 2018-08-26 NOTE — ASSESSMENT & PLAN NOTE
-Diuresed extremely well since admit, RHC shows good volume off-loading and adequate CO. Now dry with mild bump in creatinine.  -Changed to lasix 40mg PO daily, can discharge on this regimen  -continue lisinopril 2.5mg BID  -NYHA class III symptoms. Had multiple admissions recently. Remains at high risk for HF readmissions, he is interested in Cardiomems device in view of his NYHA class III symptoms and recent HF admission, but will need to follow up as outpatient once approved.  -Ambulate as tolerated, PT/OT  -Plan on discharge Monday AM, he has a ride scheduled for noon

## 2018-08-26 NOTE — SUBJECTIVE & OBJECTIVE
Interval History: No events overnight, feels well, hungry and requesting double portion.    Continuous Infusions:  Scheduled Meds:   amiodarone  400 mg Oral BID    doxazosin  1 mg Oral Daily    fluticasone-vilanterol  1 puff Inhalation Daily    furosemide  40 mg Oral Daily    lisinopril  2.5 mg Oral BID    magnesium oxide  400 mg Oral BID    nicotine  1 patch Transdermal Q24H    pantoprazole  40 mg Oral Daily    polyethylene glycol  17 g Oral Daily    rivaroxaban  20 mg Oral Daily with dinner    sodium chloride 0.9%  3 mL Intravenous Q8H     PRN Meds:acetaminophen, albuterol, dextrose 50%, glucagon (human recombinant), insulin aspart U-100, ondansetron    Review of patient's allergies indicates:   Allergen Reactions    Buspirone Palpitations     Objective:     Vital Signs (Most Recent):  Temp: 98 °F (36.7 °C) (08/26/18 0728)  Pulse: 86 (08/26/18 0833)  Resp: 18 (08/26/18 0833)  BP: 102/66 (08/26/18 0728)  SpO2: (!) 93 % (08/26/18 0728) Vital Signs (24h Range):  Temp:  [96.8 °F (36 °C)-98 °F (36.7 °C)] 98 °F (36.7 °C)  Pulse:  [78-93] 86  Resp:  [18-20] 18  SpO2:  [93 %-99 %] 93 %  BP: ()/(60-68) 102/66     Patient Vitals for the past 72 hrs (Last 3 readings):   Weight   08/26/18 0700 67.3 kg (148 lb 5.9 oz)   08/25/18 0747 65.9 kg (145 lb 4.5 oz)   08/24/18 0729 66.9 kg (147 lb 7.8 oz)     Body mass index is 23.24 kg/m².      Intake/Output Summary (Last 24 hours) at 8/26/2018 1029  Last data filed at 8/26/2018 0741  Gross per 24 hour   Intake 2160 ml   Output 4975 ml   Net -2815 ml       Hemodynamic Parameters:       Telemetry: NSR, no events    Physical Exam   Constitutional: He is oriented to person, place, and time. He appears well-developed and well-nourished.   HENT:   Head: Normocephalic.   Eyes: Pupils are equal, round, and reactive to light.   Neck: Normal range of motion. Neck supple. No JVD present.   Cardiovascular: Normal rate and regular rhythm.   Murmur heard.  Pulmonary/Chest: Effort  normal and breath sounds normal.   Abdominal: Soft. Bowel sounds are normal.   Musculoskeletal: Normal range of motion.   Neurological: He is alert and oriented to person, place, and time.   Skin: Skin is warm and dry.   Psychiatric: He has a normal mood and affect. His behavior is normal.   Nursing note and vitals reviewed.      Significant Labs:  CBC:  Recent Labs   Lab  08/20/18   0046  08/21/18   0316  08/23/18   0718   WBC  8.48  8.48  7.30  7.46   RBC  4.43*  4.43*  4.31*  4.65   HGB  12.6*  12.6*  12.7*  13.1*   HCT  39.4*  39.4*  37.9*  40.8   PLT  81*  81*  90*  145*   MCV  89  89  88  88   MCH  28.4  28.4  29.5  28.2   MCHC  32.0  32.0  33.5  32.1     BNP:  Recent Labs   Lab  08/20/18   0046   BNP  1,860*     CMP:  Recent Labs   Lab  08/24/18   0549  08/24/18   1221  08/25/18   0502  08/26/18   0347   GLU  109  96  118*  122*   CALCIUM  9.6  9.8  9.3  9.6   ALBUMIN  3.4*   --   3.4*  3.5   PROT  6.7   --   6.4  6.5   NA  136  133*  137  134*   K  5.5*  5.1  4.6  4.3   CO2  34*  33*  29  27   CL  95  93*  99  96   BUN  29*  26*  31*  38*   CREATININE  1.5*  1.4  1.3  1.6*   ALKPHOS  82   --   77  77   ALT  114*   --   101*  86*   AST  25   --   33  28   BILITOT  1.1*   --   1.2*  0.9      Coagulation:   Recent Labs   Lab  08/24/18   0549  08/25/18   0502  08/26/18   0347   INR  1.0  1.0  1.3*   APTT  24.9  25.2  32.7*     LDH:  No results for input(s): LDH in the last 72 hours.  Microbiology:  Microbiology Results (last 7 days)     ** No results found for the last 168 hours. **          ABGs: No results for input(s): PH, PCO2, HCO3, POCSATURATED, BE in the last 72 hours.  I have reviewed all pertinent labs within the past 24 hours.    Estimated Creatinine Clearance: 47.6 mL/min (A) (based on SCr of 1.6 mg/dL (H)).

## 2018-08-26 NOTE — PROGRESS NOTES
[facility]  Heart Transplant  Progress Note    Patient Name: Colton Harley  MRN: [id number]  Admission Date: 8/19/2018  Hospital Length of Stay: 7 days  Attending Physician: Reba Brewster MD  Primary Care Provider: Primary Doctor No  Principal Problem:Acute on chronic combined systolic and diastolic heart failure    Subjective:     Interval History: No events overnight, feels well, hungry and requesting double portion.    Continuous Infusions:  Scheduled Meds:   amiodarone  400 mg Oral BID    doxazosin  1 mg Oral Daily    fluticasone-vilanterol  1 puff Inhalation Daily    furosemide  40 mg Oral Daily    lisinopril  2.5 mg Oral BID    magnesium oxide  400 mg Oral BID    nicotine  1 patch Transdermal Q24H    pantoprazole  40 mg Oral Daily    polyethylene glycol  17 g Oral Daily    rivaroxaban  20 mg Oral Daily with dinner    sodium chloride 0.9%  3 mL Intravenous Q8H     PRN Meds:acetaminophen, albuterol, dextrose 50%, glucagon (human recombinant), insulin aspart U-100, ondansetron    Review of patient's allergies indicates:   Allergen Reactions    Buspirone Palpitations     Objective:     Vital Signs (Most Recent):  Temp: 98 °F (36.7 °C) (08/26/18 0728)  Pulse: 86 (08/26/18 0833)  Resp: 18 (08/26/18 0833)  BP: 102/66 (08/26/18 0728)  SpO2: (!) 93 % (08/26/18 0728) Vital Signs (24h Range):  Temp:  [96.8 °F (36 °C)-98 °F (36.7 °C)] 98 °F (36.7 °C)  Pulse:  [78-93] 86  Resp:  [18-20] 18  SpO2:  [93 %-99 %] 93 %  BP: ()/(60-68) 102/66     Patient Vitals for the past 72 hrs (Last 3 readings):   Weight   08/26/18 0700 67.3 kg (148 lb 5.9 oz)   08/25/18 0747 65.9 kg (145 lb 4.5 oz)   08/24/18 0729 66.9 kg (147 lb 7.8 oz)     Body mass index is 23.24 kg/m².      Intake/Output Summary (Last 24 hours) at 8/26/2018 1029  Last data filed at 8/26/2018 0741  Gross per 24 hour   Intake 2160 ml   Output 4975 ml   Net -2815 ml       Hemodynamic Parameters:       Telemetry: NSR, no  events    Physical Exam   Constitutional: He is oriented to person, place, and time. He appears well-developed and well-nourished.   HENT:   Head: Normocephalic.   Eyes: Pupils are equal, round, and reactive to light.   Neck: Normal range of motion. Neck supple. No JVD present.   Cardiovascular: Normal rate and regular rhythm.   Murmur heard.  Pulmonary/Chest: Effort normal and breath sounds normal.   Abdominal: Soft. Bowel sounds are normal.   Musculoskeletal: Normal range of motion.   Neurological: He is alert and oriented to person, place, and time.   Skin: Skin is warm and dry.   Psychiatric: He has a normal mood and affect. His behavior is normal.   Nursing note and vitals reviewed.      Significant Labs:  CBC:  Recent Labs   Lab  08/20/18   0046  08/21/18   0316  08/23/18   0718   WBC  8.48  8.48  7.30  7.46   RBC  4.43*  4.43*  4.31*  4.65   HGB  12.6*  12.6*  12.7*  13.1*   HCT  39.4*  39.4*  37.9*  40.8   PLT  81*  81*  90*  145*   MCV  89  89  88  88   MCH  28.4  28.4  29.5  28.2   MCHC  32.0  32.0  33.5  32.1     BNP:  Recent Labs   Lab  08/20/18   0046   BNP  1,860*     CMP:  Recent Labs   Lab  08/24/18   0549  08/24/18   1221  08/25/18   0502  08/26/18   0347   GLU  109  96  118*  122*   CALCIUM  9.6  9.8  9.3  9.6   ALBUMIN  3.4*   --   3.4*  3.5   PROT  6.7   --   6.4  6.5   NA  136  133*  137  134*   K  5.5*  5.1  4.6  4.3   CO2  34*  33*  29  27   CL  95  93*  99  96   BUN  29*  26*  31*  38*   CREATININE  1.5*  1.4  1.3  1.6*   ALKPHOS  82   --   77  77   ALT  114*   --   101*  86*   AST  25   --   33  28   BILITOT  1.1*   --   1.2*  0.9      Coagulation:   Recent Labs   Lab  08/24/18   0549  08/25/18   0502  08/26/18   0347   INR  1.0  1.0  1.3*   APTT  24.9  25.2  32.7*     LDH:  No results for input(s): LDH in the last 72 hours.  Microbiology:  Microbiology Results (last 7 days)     ** No results found for the last 168 hours. **          ABGs: No results for input(s): PH, PCO2, HCO3,  POCSATURATED, BE in the last 72 hours.  I have reviewed all pertinent labs within the past 24 hours.    Estimated Creatinine Clearance: 47.6 mL/min (A) (based on SCr of 1.6 mg/dL (H)).    Assessment and Plan:     Mr. Harley is a 58 yoM, admitted to \[facility]\"" service with acute decompensated HF and LVAD work up. 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 DC-ICD (08/11/2018), ARI thrombus, COPD with Former smoker 1.5ppd x 50 years (quit 10 days ago), NIDDM type II, Hypertension, HLD, SVT, PE on Xarelto, MARK not on Cpap, GERD, cocaine use, schizophrenia, medication noncompliance. Admitted at [facility] from 8/14/18- 8/19/18 with cardiogenic shock leading to HARPAL, elevated LFTs, recurrent VT/Afib/AFL. Initially p/w for ICD placement, which was uncomplicated and d/c home following day. Day later p/w increase SOB, acute CHF and tachycardiac, his metoprolol and sotalol was increased and again discharged home following day. That evening he started felling weak with chills, presented to ER and round to have HARPAL, elevated LFTs, cardiogenic shock SBP 60-80s. Started on Dobutamine ggt and admitted to ICU, course of his admission dobutamine was weaned off with initiation of metoprolol lead to symptomatic hypotension. Interrogation of his ICD per report noted SVT in 230s. Patient seen by Cardiology and recommend further evaluation at [facility] for LVAD, poor prognosis with NYHA III/IV with significant MCCLAIN/taxing with minimal activity and further leads to SVT with subsequent shocks. Also seen by GI for elevated LFTs, likely d/t passive congestive hepatopathy, recommended to optimize HF.     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 He was first dx of HF at [facility] 01/15/18 presented with AECHF and LLL PNA. Had a [facility] completed  which noted non-obstructive CAD (no reports in file of this). Follows up with Cardiology Dr. Montana in [place]. Xarelto last dose 08/19/19 0800 for Afib/ AFL. Since ICD placement it has fired x1 two days ago, [employer] interrogation on 08/18/18 revealed Mode VVI, HR 40BPM, VT-1 at 166NPM to monitor, VT-2 181BPM with ATP x3 followed by DCCV, VF 230BPM ATP x1 followed by DCCV (VT rate upto 230BPM into VF zone with failure of ATP x1 and subsequent 30j dccv on 8/18/18 at 11:32am)       Work up at [facility]:   RUQ us 8/15/18 consistent with some degree of cirrhosis with portal HtN.   Cr 0.9, ALB 3.1, T bili 1.3, AST//614, CPK 63, Mag 1.9, Digoxcin 0.87, INR 3.9, Troponin 0.04, BNP 1684, Hep C / Hep B negative,   Medications held Digoxin 0.25mg, Sotalol 80mg BID, Lipitor 40mg, Doxazosin 4mg,       EKG: ST with frequent PVCs, , RBBB with , Left axis deviation, inferior lead Q-waves noted,     * Acute on chronic combined systolic and diastolic heart failure    -Diuresed extremely well since admit, RHC shows good volume off-loading and adequate CO. Now dry with mild bump in creatinine.  -Changed to lasix 40mg PO daily, can discharge on this regimen  -continue lisinopril 2.5mg BID  -NYHA class III symptoms. Had multiple admissions recently. Remains at high risk for HF readmissions, he is interested in Cardiomems device in view of his NYHA class III symptoms and recent HF admission, but will need to follow up as outpatient once approved.  -Ambulate as tolerated, PT/OT  -Plan on discharge Monday AM, he has a ride scheduled for noon        Elevated LFTs    - Improving with diuresis. In the setting of congestive hepatopathy, seen by GI OSH.   - CT with normal liver attenuation  - Hep B/C non reactive.  - Holding statin therapy.         GERD (gastroesophageal reflux disease)    - continue PPI         A-fib    - Currently in NSR on EKG, hx of AFL / Afib on Xarelto.  - Maintain K > 4, Mag > 2.  -  Restarted Xarelto 20mg daily now that renal function improved.  - Appreciate EP help. IV amio load completed. Continue PO load(400mg BID for 2 weeks then decrease to 200mg daily).         V-tach    -S/p Fortify [employer] DC-ICD. VR 1357-40Q ICD by Dr. Johnson  On 08/10/18        Pulmonary emphysema    -Pt currently smoking 1.5 PPD for the past 50 years.  -Continue Nicotine patch  -Resume Inhalers Breo, Albuterol  -O2 weaned off.   -IS / AF        HARPAL (acute kidney injury)    - Improving since diuresis was held, urinating well on PO dose. Had to decrease the dose to daily given good UOP.  - Continue low dose lisinopril at 2.5 BID and lasix 40mg PO daily  - Hold Nephrotoxic medications, no Contrast, Keep normotensive and euvolemic    - Re-assess BMP a few days after discharge, would need close follow-up in clinic            Nilo Bolaños MD  Heart Transplant  [facility]

## 2018-08-26 NOTE — ASSESSMENT & PLAN NOTE
- Improving since diuresis was held, urinating well on PO dose. Had to decrease the dose to daily given good UOP.  - Continue low dose lisinopril at 2.5 BID and lasix 40mg PO daily  - Hold Nephrotoxic medications, no Contrast, Keep normotensive and euvolemic    - Re-assess BMP a few days after discharge, would need close follow-up in clinic

## 2018-08-27 LAB
ALBUMIN SERPL BCP-MCNC: 3.5 G/DL
ALP SERPL-CCNC: 73 U/L
ALT SERPL W/O P-5'-P-CCNC: 70 U/L
ANION GAP SERPL CALC-SCNC: 10 MMOL/L
AST SERPL-CCNC: 21 U/L
BILIRUB SERPL-MCNC: 0.7 MG/DL
BUN SERPL-MCNC: 40 MG/DL
CALCIUM SERPL-MCNC: 9.3 MG/DL
CHLORIDE SERPL-SCNC: 99 MMOL/L
CO2 SERPL-SCNC: 25 MMOL/L
CREAT SERPL-MCNC: 1.7 MG/DL
EST. GFR  (AFRICAN AMERICAN): 50.6 ML/MIN/1.73 M^2
EST. GFR  (NON AFRICAN AMERICAN): 43.8 ML/MIN/1.73 M^2
GLUCOSE SERPL-MCNC: 123 MG/DL
INR PPP: 1.3
MAGNESIUM SERPL-MCNC: 2 MG/DL
POCT GLUCOSE: 108 MG/DL (ref 70–110)
POCT GLUCOSE: 163 MG/DL (ref 70–110)
POCT GLUCOSE: 172 MG/DL (ref 70–110)
POCT GLUCOSE: 179 MG/DL (ref 70–110)
POTASSIUM SERPL-SCNC: 4.4 MMOL/L
PROT SERPL-MCNC: 6.6 G/DL
PROTHROMBIN TIME: 13 SEC
SODIUM SERPL-SCNC: 134 MMOL/L

## 2018-08-27 PROCEDURE — 25000003 PHARM REV CODE 250: Performed by: STUDENT IN AN ORGANIZED HEALTH CARE EDUCATION/TRAINING PROGRAM

## 2018-08-27 PROCEDURE — 94761 N-INVAS EAR/PLS OXIMETRY MLT: CPT

## 2018-08-27 PROCEDURE — S4991 NICOTINE PATCH NONLEGEND: HCPCS | Performed by: STUDENT IN AN ORGANIZED HEALTH CARE EDUCATION/TRAINING PROGRAM

## 2018-08-27 PROCEDURE — 25000003 PHARM REV CODE 250

## 2018-08-27 PROCEDURE — 36415 COLL VENOUS BLD VENIPUNCTURE: CPT

## 2018-08-27 PROCEDURE — 85610 PROTHROMBIN TIME: CPT

## 2018-08-27 PROCEDURE — 99233 SBSQ HOSP IP/OBS HIGH 50: CPT | Mod: ,,, | Performed by: NURSE PRACTITIONER

## 2018-08-27 PROCEDURE — 80053 COMPREHEN METABOLIC PANEL: CPT

## 2018-08-27 PROCEDURE — 83735 ASSAY OF MAGNESIUM: CPT

## 2018-08-27 PROCEDURE — A4216 STERILE WATER/SALINE, 10 ML: HCPCS | Performed by: STUDENT IN AN ORGANIZED HEALTH CARE EDUCATION/TRAINING PROGRAM

## 2018-08-27 PROCEDURE — 25000003 PHARM REV CODE 250: Performed by: NURSE PRACTITIONER

## 2018-08-27 PROCEDURE — 20600001 HC STEP DOWN PRIVATE ROOM

## 2018-08-27 RX ADMIN — FLUTICASONE FUROATE AND VILANTEROL TRIFENATATE 1 PUFF: 200; 25 POWDER RESPIRATORY (INHALATION) at 09:08

## 2018-08-27 RX ADMIN — LISINOPRIL 2.5 MG: 2.5 TABLET ORAL at 09:08

## 2018-08-27 RX ADMIN — MAGNESIUM OXIDE TAB 400 MG (241.3 MG ELEMENTAL MG) 400 MG: 400 (241.3 MG) TAB at 08:08

## 2018-08-27 RX ADMIN — Medication 3 ML: at 09:08

## 2018-08-27 RX ADMIN — AMIODARONE HYDROCHLORIDE 400 MG: 200 TABLET ORAL at 09:08

## 2018-08-27 RX ADMIN — PANTOPRAZOLE SODIUM 40 MG: 40 TABLET, DELAYED RELEASE ORAL at 09:08

## 2018-08-27 RX ADMIN — AMIODARONE HYDROCHLORIDE 400 MG: 200 TABLET ORAL at 08:08

## 2018-08-27 RX ADMIN — NICOTINE 1 PATCH: 21 PATCH, EXTENDED RELEASE TRANSDERMAL at 09:08

## 2018-08-27 RX ADMIN — Medication 3 ML: at 04:08

## 2018-08-27 RX ADMIN — MAGNESIUM OXIDE TAB 400 MG (241.3 MG ELEMENTAL MG) 400 MG: 400 (241.3 MG) TAB at 09:08

## 2018-08-27 RX ADMIN — RIVAROXABAN 20 MG: 20 TABLET, FILM COATED ORAL at 04:08

## 2018-08-27 RX ADMIN — LISINOPRIL 2.5 MG: 2.5 TABLET ORAL at 08:08

## 2018-08-27 RX ADMIN — POLYETHYLENE GLYCOL 3350 17 G: 17 POWDER, FOR SOLUTION ORAL at 09:08

## 2018-08-27 RX ADMIN — DOXAZOSIN 1 MG: 1 TABLET ORAL at 09:08

## 2018-08-27 NOTE — SUBJECTIVE & OBJECTIVE
Interval History: No events overnight, feels well.    Scheduled Meds:   amiodarone  400 mg Oral BID    doxazosin  1 mg Oral Daily    fluticasone-vilanterol  1 puff Inhalation Daily    lisinopril  2.5 mg Oral BID    magnesium oxide  400 mg Oral BID    nicotine  1 patch Transdermal Q24H    pantoprazole  40 mg Oral Daily    polyethylene glycol  17 g Oral Daily    rivaroxaban  20 mg Oral Daily with dinner    sodium chloride 0.9%  3 mL Intravenous Q8H     PRN Meds:acetaminophen, albuterol, dextrose 50%, glucagon (human recombinant), insulin aspart U-100, ondansetron    Review of patient's allergies indicates:   Allergen Reactions    Buspirone Palpitations     Objective:     Vital Signs (Most Recent):  Temp: 97.4 °F (36.3 °C) (08/27/18 0830)  Pulse: 89 (08/27/18 0830)  Resp: 16 (08/27/18 0830)  BP: 104/70 (08/27/18 0830)  SpO2: 96 % (08/27/18 0830) Vital Signs (24h Range):  Temp:  [97 °F (36.1 °C)-98 °F (36.7 °C)] 97.4 °F (36.3 °C)  Pulse:  [77-92] 89  Resp:  [16-20] 16  SpO2:  [92 %-97 %] 96 %  BP: ()/(50-70) 104/70     Patient Vitals for the past 72 hrs (Last 3 readings):   Weight   08/26/18 0700 67.3 kg (148 lb 5.9 oz)   08/25/18 0747 65.9 kg (145 lb 4.5 oz)     Body mass index is 23.24 kg/m².      Intake/Output Summary (Last 24 hours) at 8/27/2018 0855  Last data filed at 8/27/2018 0800  Gross per 24 hour   Intake 1580 ml   Output 3970 ml   Net -2390 ml          Telemetry: NSR, no events    Physical Exam   Constitutional: He is oriented to person, place, and time. He appears well-developed and well-nourished.   HENT:   Head: Normocephalic.   Eyes: Pupils are equal, round, and reactive to light.   Neck: Normal range of motion. Neck supple. No JVD present.   Cardiovascular: Normal rate and regular rhythm.   Murmur heard.  Pulmonary/Chest: Effort normal and breath sounds normal.   Abdominal: Soft. Bowel sounds are normal.   Musculoskeletal: Normal range of motion.   Neurological: He is alert and oriented  to person, place, and time.   Skin: Skin is warm and dry.   Psychiatric: He has a normal mood and affect. His behavior is normal.   Nursing note and vitals reviewed.      Significant Labs:  CBC:  Recent Labs   Lab  08/21/18   0316  08/23/18   0718   WBC  7.30  7.46   RBC  4.31*  4.65   HGB  12.7*  13.1*   HCT  37.9*  40.8   PLT  90*  145*   MCV  88  88   MCH  29.5  28.2   MCHC  33.5  32.1     BNP:  No results for input(s): BNP in the last 168 hours.    Invalid input(s): BNPTRIAGELBLO  CMP:  Recent Labs   Lab  08/25/18   0502  08/26/18   0347  08/27/18   0409   GLU  118*  122*  123*   CALCIUM  9.3  9.6  9.3   ALBUMIN  3.4*  3.5  3.5   PROT  6.4  6.5  6.6   NA  137  134*  134*   K  4.6  4.3  4.4   CO2  29  27  25   CL  99  96  99   BUN  31*  38*  40*   CREATININE  1.3  1.6*  1.7*   ALKPHOS  77  77  73   ALT  101*  86*  70*   AST  33  28  21   BILITOT  1.2*  0.9  0.7      Coagulation:   Recent Labs   Lab  08/24/18   0549  08/25/18   0502  08/26/18   0347  08/27/18   0409   INR  1.0  1.0  1.3*  1.3*   APTT  24.9  25.2  32.7*   --      LDH:  No results for input(s): LDH in the last 72 hours.  Microbiology:  Microbiology Results (last 7 days)     ** No results found for the last 168 hours. **          ABGs: No results for input(s): PH, PCO2, HCO3, POCSATURATED, BE in the last 72 hours.  I have reviewed all pertinent labs within the past 24 hours.    Estimated Creatinine Clearance: 44.8 mL/min (A) (based on SCr of 1.7 mg/dL (H)).

## 2018-08-27 NOTE — ASSESSMENT & PLAN NOTE
- Improving since diuresis was held, urinating well on PO dose. Had to decrease the dose to daily given good UOP.  - Continue low dose lisinopril at 2.5 BID. Holding lasix as above.   - Hold Nephrotoxic medications, no Contrast, Keep normotensive and euvolemic    - Re-assess BMP a few days after discharge, would need close follow-up in clinic

## 2018-08-27 NOTE — PROGRESS NOTES
[facility]  Heart Transplant  Progress Note    Patient Name: Colton Harley  MRN: [id number]  Admission Date: 8/19/2018  Hospital Length of Stay: 8 days  Attending Physician: Reba Brewster MD  Primary Care Provider: Primary Doctor No  Principal Problem:Acute on chronic combined systolic and diastolic heart failure    Subjective:     Interval History: No events overnight, feels well.    Scheduled Meds:   amiodarone  400 mg Oral BID    doxazosin  1 mg Oral Daily    fluticasone-vilanterol  1 puff Inhalation Daily    lisinopril  2.5 mg Oral BID    magnesium oxide  400 mg Oral BID    nicotine  1 patch Transdermal Q24H    pantoprazole  40 mg Oral Daily    polyethylene glycol  17 g Oral Daily    rivaroxaban  20 mg Oral Daily with dinner    sodium chloride 0.9%  3 mL Intravenous Q8H     PRN Meds:acetaminophen, albuterol, dextrose 50%, glucagon (human recombinant), insulin aspart U-100, ondansetron    Review of patient's allergies indicates:   Allergen Reactions    Buspirone Palpitations     Objective:     Vital Signs (Most Recent):  Temp: 97.4 °F (36.3 °C) (08/27/18 0830)  Pulse: 89 (08/27/18 0830)  Resp: 16 (08/27/18 0830)  BP: 104/70 (08/27/18 0830)  SpO2: 96 % (08/27/18 0830) Vital Signs (24h Range):  Temp:  [97 °F (36.1 °C)-98 °F (36.7 °C)] 97.4 °F (36.3 °C)  Pulse:  [77-92] 89  Resp:  [16-20] 16  SpO2:  [92 %-97 %] 96 %  BP: ()/(50-70) 104/70     Patient Vitals for the past 72 hrs (Last 3 readings):   Weight   08/26/18 0700 67.3 kg (148 lb 5.9 oz)   08/25/18 0747 65.9 kg (145 lb 4.5 oz)     Body mass index is 23.24 kg/m².      Intake/Output Summary (Last 24 hours) at 8/27/2018 0855  Last data filed at 8/27/2018 0800  Gross per 24 hour   Intake 1580 ml   Output 3970 ml   Net -2390 ml          Telemetry: NSR, no events    Physical Exam   Constitutional: He is oriented to person, place, and time. He appears well-developed and well-nourished.   HENT:   Head: Normocephalic.   Eyes: Pupils  are equal, round, and reactive to light.   Neck: Normal range of motion. Neck supple. No JVD present.   Cardiovascular: Normal rate and regular rhythm.   Murmur heard.  Pulmonary/Chest: Effort normal and breath sounds normal.   Abdominal: Soft. Bowel sounds are normal.   Musculoskeletal: Normal range of motion.   Neurological: He is alert and oriented to person, place, and time.   Skin: Skin is warm and dry.   Psychiatric: He has a normal mood and affect. His behavior is normal.   Nursing note and vitals reviewed.      Significant Labs:  CBC:  Recent Labs   Lab  08/21/18   0316  08/23/18   0718   WBC  7.30  7.46   RBC  4.31*  4.65   HGB  12.7*  13.1*   HCT  37.9*  40.8   PLT  90*  145*   MCV  88  88   MCH  29.5  28.2   MCHC  33.5  32.1     BNP:  No results for input(s): BNP in the last 168 hours.    Invalid input(s): BNPTRIAGELBLO  CMP:  Recent Labs   Lab  08/25/18   0502  08/26/18 0347 08/27/18   0409   GLU  118*  122*  123*   CALCIUM  9.3  9.6  9.3   ALBUMIN  3.4*  3.5  3.5   PROT  6.4  6.5  6.6   NA  137  134*  134*   K  4.6  4.3  4.4   CO2  29  27  25   CL  99  96  99   BUN  31*  38*  40*   CREATININE  1.3  1.6*  1.7*   ALKPHOS  77  77  73   ALT  101*  86*  70*   AST  33  28  21   BILITOT  1.2*  0.9  0.7      Coagulation:   Recent Labs   Lab  08/24/18   0549  08/25/18   0502  08/26/18 0347 08/27/18   0409   INR  1.0  1.0  1.3*  1.3*   APTT  24.9  25.2  32.7*   --      LDH:  No results for input(s): LDH in the last 72 hours.  Microbiology:  Microbiology Results (last 7 days)     ** No results found for the last 168 hours. **          ABGs: No results for input(s): PH, PCO2, HCO3, POCSATURATED, BE in the last 72 hours.  I have reviewed all pertinent labs within the past 24 hours.    Estimated Creatinine Clearance: 44.8 mL/min (A) (based on SCr of 1.7 mg/dL (H)).    Assessment and Plan:     Mr. Harley is a 58 yoM, admitted to HTS service with acute decompensated HF and LVAD work up. PMhx NICM/HPrEF (LVef  10-15%) s/p [employer] DC-ICD (08/11/2018), ARI thrombus, COPD with Former smoker 1.5ppd x 50 years (quit 10 days ago), NIDDM type II, Hypertension, HLD, SVT, PE on Xarelto, MARK not on Cpap, GERD, cocaine use, schizophrenia, medication noncompliance. Admitted at [facility] from 8/14/18- 8/19/18 with cardiogenic shock leading to HARPAL, elevated LFTs, recurrent VT/Afib/AFL. Initially p/w for ICD placement, which was uncomplicated and d/c home following day. Day later p/w increase SOB, acute CHF and tachycardiac, his metoprolol and sotalol was increased and again discharged home following day. That evening he started felling weak with chills, presented to ER and round to have HARPAL, elevated LFTs, cardiogenic shock SBP 60-80s. Started on Dobutamine ggt and admitted to ICU, course of his admission dobutamine was weaned off with initiation of metoprolol lead to symptomatic hypotension. Interrogation of his ICD per report noted SVT in 230s. Patient seen by Cardiology and recommend further evaluation at [facility] for LVAD, poor prognosis with NYHA III/IV with significant MCCLAIN/taxing with minimal activity and further leads to SVT with subsequent shocks. Also seen by GI for elevated LFTs, likely d/t passive congestive hepatopathy, recommended to optimize HF.     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 He was first dx of HF at [facility] 01/15/18 presented with AECHF and LLL PNA. Had a LHC completed which noted non-obstructive CAD (no reports in file of this). Follows up with Cardiology Dr. [place] in [place]. Xarelto last dose 08/19/19 0800 for Afib/ AFL. Since ICD placement it has fired x1 two days ago, [employer] interrogation on 08/18/18 revealed Mode VVI, HR 40BPM, VT-1 at 166NPM to monitor, VT-2 181BPM with ATP x3 followed by  DCCV, VF 230BPM ATP x1 followed by DCCV (VT rate upto 230BPM into VF zone with failure of ATP x1 and subsequent 30j dccv on 8/18/18 at 11:32am)       Work up at [facility]:   RUQ us 8/15/18 consistent with some degree of cirrhosis with portal HtN.   Cr 0.9, ALB 3.1, T bili 1.3, AST//614, CPK 63, Mag 1.9, Digoxcin 0.87, INR 3.9, Troponin 0.04, BNP 1684, Hep C / Hep B negative,   Medications held Digoxin 0.25mg, Sotalol 80mg BID, Lipitor 40mg, Doxazosin 4mg,       EKG: ST with frequent PVCs, , RBBB with , Left axis deviation, inferior lead Q-waves noted,     * Acute on chronic combined systolic and diastolic heart failure    -Diuresed extremely well since admit, RHC as above.   -Will hold lasix this am with HARPAL.   -Continue lisinopril 2.5mg BID  -NYHA class III symptoms. Had multiple admissions recently. Remains at high risk for HF readmissions, he is interested in Cardiomems device in view of his NYHA class III symptoms and recent HF admission, but will need to follow up as outpatient once approved.  -Ambulate as tolerated, PT/OT        HARPAL (acute kidney injury)    - Improving since diuresis was held, urinating well on PO dose. Had to decrease the dose to daily given good UOP.  - Continue low dose lisinopril at 2.5 BID. Holding lasix as above.   - Hold Nephrotoxic medications, no Contrast, Keep normotensive and euvolemic    - Re-assess BMP a few days after discharge, would need close follow-up in clinic        Elevated LFTs    - Improving with diuresis. In the setting of congestive hepatopathy, seen by GI OSH.   - CT with normal liver attenuation  - Hep B/C non reactive.  - Holding statin therapy.         GERD (gastroesophageal reflux disease)    - continue PPI         A-fib    - Currently in NSR on EKG, hx of AFL / Afib on Xarelto.  - Maintain K > 4, Mag > 2.  - Restarted Xarelto 20mg daily now that renal function improved.  - Appreciate EP help. IV amio load completed. Continue PO  load(400mg BID for 2 weeks then decrease to 200mg daily).         V-tach    -S/p Fortify [employer] DC-ICD. VR 1357-40Q ICD by Dr. Johnson  On 08/10/18        Pulmonary emphysema    -Pt currently smoking 1.5 PPD for the past 50 years.  -Continue Nicotine patch  -Resume Inhalers Breo, Albuterol  -O2 weaned off.   -IS / AF            Best Solorzano, NP  Heart Transplant  [facility]

## 2018-08-27 NOTE — ASSESSMENT & PLAN NOTE
-Diuresed extremely well since admit, RHC as above.   -Will hold lasix this am with HARPAL.   -Continue lisinopril 2.5mg BID  -NYHA class III symptoms. Had multiple admissions recently. Remains at high risk for HF readmissions, he is interested in Cardiomems device in view of his NYHA class III symptoms and recent HF admission, but will need to follow up as outpatient once approved.  -Ambulate as tolerated, PT/OT

## 2018-08-27 NOTE — PLAN OF CARE
Problem: Patient Care Overview  Goal: Plan of Care Review  Outcome: Ongoing (interventions implemented as appropriate)  Plan of care reviewed with pt. Pt to stay another day due to elevated renal values. Lasix held this am. No further questions at this time. Will continue to monitor.

## 2018-08-27 NOTE — PROGRESS NOTES
Update:    SW to pt's room for update. Pt aaox4 and communicative. Pt reports plan to D/C home tomorrow. Pt requesting SW call [employer] to arrange transport ([phone]). SW called [employer] and was told pt's transportation benefit does not cover trips over 100 miles, and pt lives 130 miles from hospital. SW called Medicaid and was told pt does not have Medicaid transportation benefit. SW spoke to pt about transport home. Pt reports none of his family members have cars, and he does not have any friends who can assist. Pt reports plan to work on finding a ride this evening. SW to follow up as appropriate. SW providing ongoing psychosocial and counseling support, education, assistance, resources, and discharge planning as indicated. SW continuing to follow and remains available.

## 2018-08-28 LAB
ALBUMIN SERPL BCP-MCNC: 3.4 G/DL
ALP SERPL-CCNC: 74 U/L
ALT SERPL W/O P-5'-P-CCNC: 59 U/L
ANION GAP SERPL CALC-SCNC: 10 MMOL/L
AST SERPL-CCNC: 23 U/L
BILIRUB SERPL-MCNC: 0.6 MG/DL
BUN SERPL-MCNC: 41 MG/DL
CALCIUM SERPL-MCNC: 8.8 MG/DL
CHLORIDE SERPL-SCNC: 101 MMOL/L
CO2 SERPL-SCNC: 23 MMOL/L
CREAT SERPL-MCNC: 1.9 MG/DL
EST. GFR  (AFRICAN AMERICAN): 44.3 ML/MIN/1.73 M^2
EST. GFR  (NON AFRICAN AMERICAN): 38.3 ML/MIN/1.73 M^2
GLUCOSE SERPL-MCNC: 130 MG/DL
INR PPP: 1.2
MAGNESIUM SERPL-MCNC: 2.3 MG/DL
POCT GLUCOSE: 163 MG/DL (ref 70–110)
POCT GLUCOSE: 206 MG/DL (ref 70–110)
POCT GLUCOSE: 206 MG/DL (ref 70–110)
POCT GLUCOSE: 224 MG/DL (ref 70–110)
POTASSIUM SERPL-SCNC: 4.9 MMOL/L
PROT SERPL-MCNC: 6.4 G/DL
PROTHROMBIN TIME: 11.9 SEC
SODIUM SERPL-SCNC: 134 MMOL/L

## 2018-08-28 PROCEDURE — 25000003 PHARM REV CODE 250: Performed by: NURSE PRACTITIONER

## 2018-08-28 PROCEDURE — 36415 COLL VENOUS BLD VENIPUNCTURE: CPT

## 2018-08-28 PROCEDURE — 99233 SBSQ HOSP IP/OBS HIGH 50: CPT | Mod: ,,, | Performed by: NURSE PRACTITIONER

## 2018-08-28 PROCEDURE — 20600001 HC STEP DOWN PRIVATE ROOM

## 2018-08-28 PROCEDURE — A4216 STERILE WATER/SALINE, 10 ML: HCPCS | Performed by: STUDENT IN AN ORGANIZED HEALTH CARE EDUCATION/TRAINING PROGRAM

## 2018-08-28 PROCEDURE — 80053 COMPREHEN METABOLIC PANEL: CPT

## 2018-08-28 PROCEDURE — 25000003 PHARM REV CODE 250: Performed by: STUDENT IN AN ORGANIZED HEALTH CARE EDUCATION/TRAINING PROGRAM

## 2018-08-28 PROCEDURE — S4991 NICOTINE PATCH NONLEGEND: HCPCS | Performed by: STUDENT IN AN ORGANIZED HEALTH CARE EDUCATION/TRAINING PROGRAM

## 2018-08-28 PROCEDURE — 25000003 PHARM REV CODE 250

## 2018-08-28 PROCEDURE — 85610 PROTHROMBIN TIME: CPT

## 2018-08-28 PROCEDURE — 83735 ASSAY OF MAGNESIUM: CPT

## 2018-08-28 RX ADMIN — INSULIN ASPART 2 UNITS: 100 INJECTION, SOLUTION INTRAVENOUS; SUBCUTANEOUS at 08:08

## 2018-08-28 RX ADMIN — DOXAZOSIN 1 MG: 1 TABLET ORAL at 08:08

## 2018-08-28 RX ADMIN — MAGNESIUM OXIDE TAB 400 MG (241.3 MG ELEMENTAL MG) 400 MG: 400 (241.3 MG) TAB at 08:08

## 2018-08-28 RX ADMIN — NICOTINE 1 PATCH: 21 PATCH, EXTENDED RELEASE TRANSDERMAL at 08:08

## 2018-08-28 RX ADMIN — RIVAROXABAN 20 MG: 20 TABLET, FILM COATED ORAL at 04:08

## 2018-08-28 RX ADMIN — AMIODARONE HYDROCHLORIDE 400 MG: 200 TABLET ORAL at 09:08

## 2018-08-28 RX ADMIN — PANTOPRAZOLE SODIUM 40 MG: 40 TABLET, DELAYED RELEASE ORAL at 08:08

## 2018-08-28 RX ADMIN — FLUTICASONE FUROATE AND VILANTEROL TRIFENATATE 1 PUFF: 200; 25 POWDER RESPIRATORY (INHALATION) at 08:08

## 2018-08-28 RX ADMIN — AMIODARONE HYDROCHLORIDE 400 MG: 200 TABLET ORAL at 08:08

## 2018-08-28 RX ADMIN — Medication 3 ML: at 02:08

## 2018-08-28 NOTE — SUBJECTIVE & OBJECTIVE
Interval History: Feels well. Walking all over hospital without problems. Had an run of NSVT @ ~6am this morning.     Scheduled Meds:   amiodarone  400 mg Oral BID    doxazosin  1 mg Oral Daily    fluticasone-vilanterol  1 puff Inhalation Daily    magnesium oxide  400 mg Oral BID    nicotine  1 patch Transdermal Q24H    pantoprazole  40 mg Oral Daily    polyethylene glycol  17 g Oral Daily    rivaroxaban  20 mg Oral Daily with dinner    sodium chloride 0.9%  3 mL Intravenous Q8H     PRN Meds:acetaminophen, albuterol, dextrose 50%, glucagon (human recombinant), insulin aspart U-100, ondansetron    Review of patient's allergies indicates:   Allergen Reactions    Buspirone Palpitations     Objective:     Vital Signs (Most Recent):  Temp: 97.7 °F (36.5 °C) (08/28/18 0731)  Pulse: 85 (08/28/18 0731)  Resp: 17 (08/28/18 0731)  BP: 106/70 (08/28/18 0731)  SpO2: 97 % (08/28/18 0731) Vital Signs (24h Range):  Temp:  [96.2 °F (35.7 °C)-97.8 °F (36.6 °C)] 97.7 °F (36.5 °C)  Pulse:  [78-93] 85  Resp:  [16-18] 17  SpO2:  [92 %-97 %] 97 %  BP: ()/(58-80) 106/70     Patient Vitals for the past 72 hrs (Last 3 readings):   Weight   08/28/18 0700 69.7 kg (153 lb 10.6 oz)   08/27/18 1132 68.4 kg (150 lb 12.7 oz)   08/26/18 0700 67.3 kg (148 lb 5.9 oz)     Body mass index is 24.07 kg/m².      Intake/Output Summary (Last 24 hours) at 8/28/2018 0804  Last data filed at 8/28/2018 0500  Gross per 24 hour   Intake 1740 ml   Output 2100 ml   Net -360 ml        Telemetry: NSR, no events    Physical Exam   Constitutional: He is oriented to person, place, and time. He appears well-developed and well-nourished.   HENT:   Head: Normocephalic.   Eyes: Pupils are equal, round, and reactive to light.   Neck: Normal range of motion. Neck supple. No JVD present.   Cardiovascular: Normal rate and regular rhythm.   Murmur heard.  Pulmonary/Chest: Effort normal and breath sounds normal.   Abdominal: Soft. Bowel sounds are normal.    Musculoskeletal: Normal range of motion.   Neurological: He is alert and oriented to person, place, and time.   Skin: Skin is warm and dry.   Psychiatric: He has a normal mood and affect. His behavior is normal.   Nursing note and vitals reviewed.    Significant Labs:  CBC:  Recent Labs   Lab  08/23/18   0718   WBC  7.46   RBC  4.65   HGB  13.1*   HCT  40.8   PLT  145*   MCV  88   MCH  28.2   MCHC  32.1     BNP:  No results for input(s): BNP in the last 168 hours.    Invalid input(s): BNPTRIAGELBLO  CMP:  Recent Labs   Lab  08/26/18   0347  08/27/18   0409  08/28/18   0435   GLU  122*  123*  130*   CALCIUM  9.6  9.3  8.8   ALBUMIN  3.5  3.5  3.4*   PROT  6.5  6.6  6.4   NA  134*  134*  134*   K  4.3  4.4  4.9   CO2  27  25  23   CL  96  99  101   BUN  38*  40*  41*   CREATININE  1.6*  1.7*  1.9*   ALKPHOS  77  73  74   ALT  86*  70*  59*   AST  28  21  23   BILITOT  0.9  0.7  0.6      Coagulation:   Recent Labs   Lab  08/24/18   0549  08/25/18   0502  08/26/18   0347  08/27/18   0409  08/28/18   0435   INR  1.0  1.0  1.3*  1.3*  1.2   APTT  24.9  25.2  32.7*   --    --      LDH:  No results for input(s): LDH in the last 72 hours.  Microbiology:  Microbiology Results (last 7 days)     ** No results found for the last 168 hours. **          ABGs: No results for input(s): PH, PCO2, HCO3, POCSATURATED, BE in the last 72 hours.  I have reviewed all pertinent labs within the past 24 hours.    Estimated Creatinine Clearance: 40.1 mL/min (A) (based on SCr of 1.9 mg/dL (H)).

## 2018-08-28 NOTE — PROGRESS NOTES
Update:    SW to pt's room for update. Pt aaox4, calm, and communicative. Pt reports MDs plan to d/c tomorrow and pt has been unable to find ride home. Medical team notified of pt's transportation difficulties in multidisciplinary rounds. Pt reports no other concerns at this time. SW providing ongoing psychosocial and counseling support, education, assistance, resources, and discharge planning as indicated. SW continuing to follow and remains available.

## 2018-08-28 NOTE — ASSESSMENT & PLAN NOTE
- Improving since diuresis was held, urinating well on PO dose. Had to decrease the dose to daily given good UOP.  - Holding lasix/lisinopril as above.   - Hold Nephrotoxic medications, no Contrast, Keep normotensive and euvolemic    - Re-assess BMP a few days after discharge, would need close follow-up in clinic

## 2018-08-28 NOTE — PROGRESS NOTES
[facility]  Heart Transplant  Progress Note    Patient Name: Colton Harley  MRN: [id number]  Admission Date: 8/19/2018  Hospital Length of Stay: 9 days  Attending Physician: Rubi Graham MD  Primary Care Provider: Primary Doctor No  Principal Problem:Acute on chronic combined systolic and diastolic heart failure    Subjective:     Interval History: Feels well. Walking all over hospital without problems. Had an run of NSVT @ ~6am this morning.     Scheduled Meds:   amiodarone  400 mg Oral BID    doxazosin  1 mg Oral Daily    fluticasone-vilanterol  1 puff Inhalation Daily    magnesium oxide  400 mg Oral BID    nicotine  1 patch Transdermal Q24H    pantoprazole  40 mg Oral Daily    polyethylene glycol  17 g Oral Daily    rivaroxaban  20 mg Oral Daily with dinner    sodium chloride 0.9%  3 mL Intravenous Q8H     PRN Meds:acetaminophen, albuterol, dextrose 50%, glucagon (human recombinant), insulin aspart U-100, ondansetron    Review of patient's allergies indicates:   Allergen Reactions    Buspirone Palpitations     Objective:     Vital Signs (Most Recent):  Temp: 97.7 °F (36.5 °C) (08/28/18 0731)  Pulse: 85 (08/28/18 0731)  Resp: 17 (08/28/18 0731)  BP: 106/70 (08/28/18 0731)  SpO2: 97 % (08/28/18 0731) Vital Signs (24h Range):  Temp:  [96.2 °F (35.7 °C)-97.8 °F (36.6 °C)] 97.7 °F (36.5 °C)  Pulse:  [78-93] 85  Resp:  [16-18] 17  SpO2:  [92 %-97 %] 97 %  BP: ()/(58-80) 106/70     Patient Vitals for the past 72 hrs (Last 3 readings):   Weight   08/28/18 0700 69.7 kg (153 lb 10.6 oz)   08/27/18 1132 68.4 kg (150 lb 12.7 oz)   08/26/18 0700 67.3 kg (148 lb 5.9 oz)     Body mass index is 24.07 kg/m².      Intake/Output Summary (Last 24 hours) at 8/28/2018 0804  Last data filed at 8/28/2018 0500  Gross per 24 hour   Intake 1740 ml   Output 2100 ml   Net -360 ml        Telemetry: NSR, no events    Physical Exam   Constitutional: He is oriented to person, place, and time. He appears  well-developed and well-nourished.   HENT:   Head: Normocephalic.   Eyes: Pupils are equal, round, and reactive to light.   Neck: Normal range of motion. Neck supple. No JVD present.   Cardiovascular: Normal rate and regular rhythm.   Murmur heard.  Pulmonary/Chest: Effort normal and breath sounds normal.   Abdominal: Soft. Bowel sounds are normal.   Musculoskeletal: Normal range of motion.   Neurological: He is alert and oriented to person, place, and time.   Skin: Skin is warm and dry.   Psychiatric: He has a normal mood and affect. His behavior is normal.   Nursing note and vitals reviewed.    Significant Labs:  CBC:  Recent Labs   Lab  08/23/18   0718   WBC  7.46   RBC  4.65   HGB  13.1*   HCT  40.8   PLT  145*   MCV  88   MCH  28.2   MCHC  32.1     BNP:  No results for input(s): BNP in the last 168 hours.    Invalid input(s): BNPTRIAGELBLO  CMP:  Recent Labs   Lab  08/26/18   0347  08/27/18   0409  08/28/18   0435   GLU  122*  123*  130*   CALCIUM  9.6  9.3  8.8   ALBUMIN  3.5  3.5  3.4*   PROT  6.5  6.6  6.4   NA  134*  134*  134*   K  4.3  4.4  4.9   CO2  27  25  23   CL  96  99  101   BUN  38*  40*  41*   CREATININE  1.6*  1.7*  1.9*   ALKPHOS  77  73  74   ALT  86*  70*  59*   AST  28  21  23   BILITOT  0.9  0.7  0.6      Coagulation:   Recent Labs   Lab  08/24/18   0549  08/25/18   0502  08/26/18   0347  08/27/18   0409  08/28/18   0435   INR  1.0  1.0  1.3*  1.3*  1.2   APTT  24.9  25.2  32.7*   --    --      LDH:  No results for input(s): LDH in the last 72 hours.  Microbiology:  Microbiology Results (last 7 days)     ** No results found for the last 168 hours. **          ABGs: No results for input(s): PH, PCO2, HCO3, POCSATURATED, BE in the last 72 hours.  I have reviewed all pertinent labs within the past 24 hours.    Estimated Creatinine Clearance: 40.1 mL/min (A) (based on SCr of 1.9 mg/dL (H)).    Assessment and Plan:     Mr. Harley is a 58 yoM, admitted to [facility] service with acute decompensated HF  and LVAD work up. 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 Thai DC-ICD (08/11/2018), ARI thrombus, COPD with Former smoker 1.5ppd x 50 years (quit 10 days ago), NIDDM type II, Hypertension, HLD, SVT, PE on Xarelto, MARK not on Cpap, GERD, cocaine use, schizophrenia, medication noncompliance. Admitted at [facility] from 8/14/18- 8/19/18 with cardiogenic shock leading to HARPAL, elevated LFTs, recurrent VT/Afib/AFL. Initially p/w for ICD placement, which was uncomplicated and d/c home following day. Day later p/w increase SOB, acute CHF and tachycardiac, his metoprolol and sotalol was increased and again discharged home following day. That evening he started felling weak with chills, presented to ER and round to have HARPAL, elevated LFTs, cardiogenic shock SBP 60-80s. Started on Dobutamine ggt and admitted to ICU, course of his admission dobutamine was weaned off with initiation of metoprolol lead to symptomatic hypotension. Interrogation of his ICD per report noted SVT in 230s. Patient seen by Cardiology and recommend further evaluation at [facility] for LVAD, poor prognosis with NYHA III/IV with significant MCCLAIN/taxing with minimal activity and further leads to SVT with subsequent shocks. Also seen by GI for elevated LFTs, likely d/t passive congestive hepatopathy, recommended to optimize HF.     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 He was first dx of HF at [facility] 01/15/18 presented with AECHF and LLL PNA. Had a LHC completed which noted non-obstructive CAD (no reports in file of this). Follows up with Cardiology Dr. [place] in [place]. Xarelto last dose 08/19/19 0800 for Afib/ AFL. Since ICD placement it has fired x1 two days ago, [employer] interrogation on 08/18/18 revealed Mode VVI, HR 40BPM, VT-1 at 166NPM to  monitor, VT-2 181BPM with ATP x3 followed by DCCV, VF 230BPM ATP x1 followed by DCCV (VT rate upto 230BPM into VF zone with failure of ATP x1 and subsequent 30j dccv on 8/18/18 at 11:32am)       Work up at [facility]:   RUQ us 8/15/18 consistent with some degree of cirrhosis with portal HtN.   Cr 0.9, ALB 3.1, T bili 1.3, AST//614, CPK 63, Mag 1.9, Digoxcin 0.87, INR 3.9, Troponin 0.04, BNP 1684, Hep C / Hep B negative,   Medications held Digoxin 0.25mg, Sotalol 80mg BID, Lipitor 40mg, Doxazosin 4mg,       EKG: ST with frequent PVCs, , RBBB with , Left axis deviation, inferior lead Q-waves noted,     * Acute on chronic combined systolic and diastolic heart failure    -Diuresed extremely well since admit, RHC as above.   -Holding lasix with HARPAL.   -Will also hold lisinopril due to rising Cr.   -NYHA class III symptoms. Had multiple admissions recently. Remains at high risk for HF readmissions, he is interested in Cardiomems device in view of his NYHA class III symptoms and recent HF admission, but will need to follow up as outpatient once approved.  -Ambulate as tolerated, PT/OT        HARPAL (acute kidney injury)    - Improving since diuresis was held, urinating well on PO dose. Had to decrease the dose to daily given good UOP.  - Holding lasix/lisinopril as above.   - Hold Nephrotoxic medications, no Contrast, Keep normotensive and euvolemic    - Re-assess BMP a few days after discharge, would need close follow-up in clinic        V-tach    -S/p Fortify [employer] DC-ICD. VR 1357-40Q ICD by Dr. Johnson  On 08/10/18.  -Continue PO amio load.  -Episode of VT earlier this am noted.         Elevated LFTs    - Improving with diuresis. In the setting of congestive hepatopathy, seen by GI OSH.   - CT with normal liver attenuation  - Hep B/C non reactive.  - Holding statin therapy.         GERD (gastroesophageal reflux disease)    - continue PPI         A-fib    - Currently in NSR on EKG,  hx of AFL / Afib on Xarelto.  - Maintain K > 4, Mag > 2.  - Restarted Xarelto 20mg daily now that renal function improved.  - Appreciate EP help. IV amio load completed. Continue PO load(400mg BID for 2 weeks then decrease to 200mg daily).         Pulmonary emphysema    -Pt currently smoking 1.5 PPD for the past 50 years.  -Continue Nicotine patch  -Resume Inhalers Breo, Albuterol  -O2 weaned off.   -IS / AF        Dictation #1  MRN:[id number]  CSN:[id number]      Best Solorzano NP  Heart Transplant  [facility]

## 2018-08-28 NOTE — PROGRESS NOTES
Pt had a 16 beat run of VTach. No CO CP, SOB, N/V, or dizziness. MD on HTS notified. Ordered to call back when K lab results are in. No further orders at this time. Will continue to monitor.

## 2018-08-28 NOTE — PLAN OF CARE
Problem: Patient Care Overview  Goal: Plan of Care Review  Outcome: Ongoing (interventions implemented as appropriate)  Pt remains free from falls and injury during shift. Pt not d/c because of elevated LF. BUN 41 creatinine 1.9. Blood glucose monitored, WNL. VSS, pt voiced no complaints. POC updated with pt, will continue to monitor.

## 2018-08-28 NOTE — PROGRESS NOTES
At beginning of shift. Pt questioning RN on where the biggest [employer] was. Wished to panhandle upon DC so to buy bus ticket. Will make a note to treatment team on pt chart.

## 2018-08-28 NOTE — PLAN OF CARE
Problem: Patient Care Overview  Goal: Plan of Care Review  Outcome: Ongoing (interventions implemented as appropriate)  Pt free of falls or injury. No reports of CP. Paced Sinue Rhythm. Plan of care reviewed with pt. Pt to stay another day due to elevated renal values. Lasix held this am and DCed. No further questions at this time. Will continue to monitor.

## 2018-08-28 NOTE — PROGRESS NOTES
Pt continues to leave the floor despite RN asking pt to notify her when Pt wishes to wander the hospital. Warned not to leave floor due to continuous monitoring status. PT informed RN that he sometimes leaves the hospital to go outside. Was locked out a few nights ago, thought this was funny. RN warned nopt to leave unit of hospital. PT stated that he would try. Currently off unit and not able to be located.

## 2018-08-28 NOTE — ASSESSMENT & PLAN NOTE
-Diuresed extremely well since admit, RHC as above.   -Holding lasix with HARPAL.   -Will also hold lisinopril due to rising Cr.   -NYHA class III symptoms. Had multiple admissions recently. Remains at high risk for HF readmissions, he is interested in Cardiomems device in view of his NYHA class III symptoms and recent HF admission, but will need to follow up as outpatient once approved.  -Ambulate as tolerated, PT/OT

## 2018-08-28 NOTE — ASSESSMENT & PLAN NOTE
-S/p Fortify Assura [employer] DC-ICD. VR 1357-40Q ICD by Dr. Johnson  On 08/10/18.  -Continue PO amio load.  -Episode of VT earlier this am noted.

## 2018-08-29 VITALS
BODY MASS INDEX: 24.36 KG/M2 | HEART RATE: 92 BPM | SYSTOLIC BLOOD PRESSURE: 108 MMHG | DIASTOLIC BLOOD PRESSURE: 67 MMHG | RESPIRATION RATE: 18 BRPM | OXYGEN SATURATION: 93 % | WEIGHT: 155.19 LBS | HEIGHT: 67 IN | TEMPERATURE: 99 F

## 2018-08-29 DIAGNOSIS — I50.43 ACUTE ON CHRONIC COMBINED SYSTOLIC AND DIASTOLIC CONGESTIVE HEART FAILURE: Primary | ICD-10-CM

## 2018-08-29 LAB
ALBUMIN SERPL BCP-MCNC: 3.5 G/DL
ALP SERPL-CCNC: 69 U/L
ALT SERPL W/O P-5'-P-CCNC: 51 U/L
ANION GAP SERPL CALC-SCNC: 7 MMOL/L
AST SERPL-CCNC: 18 U/L
BILIRUB SERPL-MCNC: 0.8 MG/DL
BUN SERPL-MCNC: 34 MG/DL
CALCIUM SERPL-MCNC: 9.1 MG/DL
CHLORIDE SERPL-SCNC: 102 MMOL/L
CO2 SERPL-SCNC: 26 MMOL/L
CREAT SERPL-MCNC: 1.6 MG/DL
EST. GFR  (AFRICAN AMERICAN): 54.5 ML/MIN/1.73 M^2
EST. GFR  (NON AFRICAN AMERICAN): 47.1 ML/MIN/1.73 M^2
GLUCOSE SERPL-MCNC: 139 MG/DL
INR PPP: 1.3
MAGNESIUM SERPL-MCNC: 2.2 MG/DL
POCT GLUCOSE: 212 MG/DL (ref 70–110)
POCT GLUCOSE: 263 MG/DL (ref 70–110)
POTASSIUM SERPL-SCNC: 4.7 MMOL/L
PROT SERPL-MCNC: 6.6 G/DL
PROTHROMBIN TIME: 12.9 SEC
SODIUM SERPL-SCNC: 135 MMOL/L

## 2018-08-29 PROCEDURE — S4991 NICOTINE PATCH NONLEGEND: HCPCS | Performed by: STUDENT IN AN ORGANIZED HEALTH CARE EDUCATION/TRAINING PROGRAM

## 2018-08-29 PROCEDURE — 85610 PROTHROMBIN TIME: CPT

## 2018-08-29 PROCEDURE — 25000003 PHARM REV CODE 250: Performed by: NURSE PRACTITIONER

## 2018-08-29 PROCEDURE — 99238 HOSP IP/OBS DSCHRG MGMT 30/<: CPT | Mod: ,,, | Performed by: INTERNAL MEDICINE

## 2018-08-29 PROCEDURE — 25000003 PHARM REV CODE 250: Performed by: STUDENT IN AN ORGANIZED HEALTH CARE EDUCATION/TRAINING PROGRAM

## 2018-08-29 PROCEDURE — A4216 STERILE WATER/SALINE, 10 ML: HCPCS | Performed by: STUDENT IN AN ORGANIZED HEALTH CARE EDUCATION/TRAINING PROGRAM

## 2018-08-29 PROCEDURE — 80053 COMPREHEN METABOLIC PANEL: CPT

## 2018-08-29 PROCEDURE — 36415 COLL VENOUS BLD VENIPUNCTURE: CPT

## 2018-08-29 PROCEDURE — 83735 ASSAY OF MAGNESIUM: CPT

## 2018-08-29 RX ORDER — DOXAZOSIN 1 MG/1
1 TABLET ORAL NIGHTLY
Status: DISCONTINUED | OUTPATIENT
Start: 2018-08-29 | End: 2018-08-29 | Stop reason: HOSPADM

## 2018-08-29 RX ORDER — FUROSEMIDE 20 MG/1
20 TABLET ORAL DAILY
Status: DISCONTINUED | OUTPATIENT
Start: 2018-08-30 | End: 2018-08-29 | Stop reason: HOSPADM

## 2018-08-29 RX ORDER — HYDRALAZINE HYDROCHLORIDE 10 MG/1
10 TABLET, FILM COATED ORAL EVERY 8 HOURS
Qty: 90 TABLET | Refills: 11 | Status: SHIPPED | OUTPATIENT
Start: 2018-08-29 | End: 2019-08-29

## 2018-08-29 RX ORDER — AMIODARONE HYDROCHLORIDE 400 MG/1
TABLET ORAL
Qty: 180 TABLET | Refills: 3 | Status: SHIPPED | OUTPATIENT
Start: 2018-08-29

## 2018-08-29 RX ORDER — METOPROLOL SUCCINATE 25 MG/1
12.5 TABLET, EXTENDED RELEASE ORAL DAILY
Qty: 90 TABLET | Refills: 3 | Status: SHIPPED | OUTPATIENT
Start: 2018-08-29

## 2018-08-29 RX ORDER — DOXAZOSIN 1 MG/1
1 TABLET ORAL NIGHTLY
Qty: 90 TABLET | Refills: 3 | Status: SHIPPED | OUTPATIENT
Start: 2018-08-29 | End: 2019-08-29

## 2018-08-29 RX ORDER — ISOSORBIDE DINITRATE 10 MG/1
10 TABLET ORAL 3 TIMES DAILY
Qty: 90 TABLET | Refills: 11 | Status: SHIPPED | OUTPATIENT
Start: 2018-08-29 | End: 2019-08-29

## 2018-08-29 RX ORDER — LANOLIN ALCOHOL/MO/W.PET/CERES
400 CREAM (GRAM) TOPICAL 2 TIMES DAILY
Refills: 0 | COMMUNITY
Start: 2018-08-29

## 2018-08-29 RX ORDER — ISOSORBIDE DINITRATE 10 MG/1
10 TABLET ORAL 3 TIMES DAILY
Status: DISCONTINUED | OUTPATIENT
Start: 2018-08-29 | End: 2018-08-29 | Stop reason: HOSPADM

## 2018-08-29 RX ORDER — FUROSEMIDE 20 MG/1
20 TABLET ORAL DAILY
Qty: 90 TABLET | Refills: 3 | Status: SHIPPED | OUTPATIENT
Start: 2018-08-29

## 2018-08-29 RX ORDER — HYDRALAZINE HYDROCHLORIDE 10 MG/1
10 TABLET, FILM COATED ORAL EVERY 8 HOURS
Status: DISCONTINUED | OUTPATIENT
Start: 2018-08-29 | End: 2018-08-29 | Stop reason: HOSPADM

## 2018-08-29 RX ADMIN — AMIODARONE HYDROCHLORIDE 400 MG: 200 TABLET ORAL at 08:08

## 2018-08-29 RX ADMIN — INSULIN ASPART 3 UNITS: 100 INJECTION, SOLUTION INTRAVENOUS; SUBCUTANEOUS at 08:08

## 2018-08-29 RX ADMIN — ISOSORBIDE DINITRATE 10 MG: 10 TABLET ORAL at 08:08

## 2018-08-29 RX ADMIN — NICOTINE 1 PATCH: 21 PATCH, EXTENDED RELEASE TRANSDERMAL at 08:08

## 2018-08-29 RX ADMIN — FLUTICASONE FUROATE AND VILANTEROL TRIFENATATE 1 PUFF: 200; 25 POWDER RESPIRATORY (INHALATION) at 08:08

## 2018-08-29 RX ADMIN — MAGNESIUM OXIDE TAB 400 MG (241.3 MG ELEMENTAL MG) 400 MG: 400 (241.3 MG) TAB at 08:08

## 2018-08-29 RX ADMIN — Medication 3 ML: at 06:08

## 2018-08-29 RX ADMIN — PANTOPRAZOLE SODIUM 40 MG: 40 TABLET, DELAYED RELEASE ORAL at 08:08

## 2018-08-29 NOTE — PROGRESS NOTES
D/C:    SW to pt's room for D/C. Pt aaox4, calm, and communicative. Pt reports in agreement with plan to D/C home today. Pt reports coping adequately today. Pt reports unable to find ride home. SW received permission from case management to arranged transport home through [facility] ([id number]) at a cost of $ 692. SW received call from pharmacy stating pt unable to pay his $30 copay. JADIEL Bell CM, to obtain approval to cover pt's medications. No other needs identified at this time. SW providing psychosocial and counseling support, education, assistance, resources, and D/C planning as indicated. SW remains available.

## 2018-08-29 NOTE — SUBJECTIVE & OBJECTIVE
Interval History: Feels well. Walking all over hospital without problems.   Scheduled Meds:   amiodarone  400 mg Oral BID    doxazosin  1 mg Oral QHS    fluticasone-vilanterol  1 puff Inhalation Daily    [START ON 8/30/2018] furosemide  20 mg Oral Daily    hydrALAZINE  10 mg Oral Q8H    isosorbide dinitrate  10 mg Oral TID    magnesium oxide  400 mg Oral BID    nicotine  1 patch Transdermal Q24H    pantoprazole  40 mg Oral Daily    polyethylene glycol  17 g Oral Daily    rivaroxaban  20 mg Oral Daily with dinner    sodium chloride 0.9%  3 mL Intravenous Q8H     PRN Meds:acetaminophen, albuterol, dextrose 50%, glucagon (human recombinant), insulin aspart U-100, ondansetron    Review of patient's allergies indicates:   Allergen Reactions    Buspirone Palpitations     Objective:     Vital Signs (Most Recent):  Temp: 97.9 °F (36.6 °C) (08/29/18 0500)  Pulse: 78 (08/29/18 0700)  Resp: 18 (08/29/18 0500)  BP: 105/76 (08/29/18 0500)  SpO2: 97 % (08/29/18 0500) Vital Signs (24h Range):  Temp:  [96.3 °F (35.7 °C)-98.2 °F (36.8 °C)] 97.9 °F (36.6 °C)  Pulse:  [] 78  Resp:  [16-18] 18  SpO2:  [90 %-98 %] 97 %  BP: ()/(66-83) 105/76     Patient Vitals for the past 72 hrs (Last 3 readings):   Weight   08/29/18 0700 70.4 kg (155 lb 3.3 oz)   08/28/18 0700 69.7 kg (153 lb 10.6 oz)   08/27/18 1132 68.4 kg (150 lb 12.7 oz)     Body mass index is 24.31 kg/m².      Intake/Output Summary (Last 24 hours) at 8/29/2018 0828  Last data filed at 8/29/2018 0600  Gross per 24 hour   Intake 960 ml   Output 2501 ml   Net -1541 ml        Telemetry: NSR, no events    Physical Exam   Constitutional: He is oriented to person, place, and time. He appears well-developed and well-nourished.   HENT:   Head: Normocephalic.   Eyes: Pupils are equal, round, and reactive to light.   Neck: Normal range of motion. Neck supple. No JVD present.   Cardiovascular: Normal rate and regular rhythm.   Murmur heard.  Pulmonary/Chest: Effort  normal and breath sounds normal.   Abdominal: Soft. Bowel sounds are normal.   Musculoskeletal: Normal range of motion.   Neurological: He is alert and oriented to person, place, and time.   Skin: Skin is warm and dry.   Psychiatric: He has a normal mood and affect. His behavior is normal.   Nursing note and vitals reviewed.    Significant Labs:  CBC:  Recent Labs   Lab  08/23/18   0718   WBC  7.46   RBC  4.65   HGB  13.1*   HCT  40.8   PLT  145*   MCV  88   MCH  28.2   MCHC  32.1     BNP:  No results for input(s): BNP in the last 168 hours.    Invalid input(s): BNPTRIAGELBLO  CMP:  Recent Labs   Lab  08/27/18   0409  08/28/18   0435  08/29/18   0449   GLU  123*  130*  139*   CALCIUM  9.3  8.8  9.1   ALBUMIN  3.5  3.4*  3.5   PROT  6.6  6.4  6.6   NA  134*  134*  135*   K  4.4  4.9  4.7   CO2  25  23  26   CL  99  101  102   BUN  40*  41*  34*   CREATININE  1.7*  1.9*  1.6*   ALKPHOS  73  74  69   ALT  70*  59*  51*   AST  21  23  18   BILITOT  0.7  0.6  0.8      Coagulation:   Recent Labs   Lab  08/24/18   0549  08/25/18   0502  08/26/18   0347  08/27/18   0409  08/28/18   0435  08/29/18   0449   INR  1.0  1.0  1.3*  1.3*  1.2  1.3*   APTT  24.9  25.2  32.7*   --    --    --      LDH:  No results for input(s): LDH in the last 72 hours.  Microbiology:  Microbiology Results (last 7 days)     ** No results found for the last 168 hours. **          ABGs: No results for input(s): PH, PCO2, HCO3, POCSATURATED, BE in the last 72 hours.  I have reviewed all pertinent labs within the past 24 hours.    Estimated Creatinine Clearance: 47.6 mL/min (A) (based on SCr of 1.6 mg/dL (H)).

## 2018-08-29 NOTE — NURSING
Patient dc per md orders. IV and tele removed. Patient verbalized complete understanding of home care instructions and follow up care. Pamphlet on heart failure home care instructions given to patient. VSS, pt voiced no complants. meds delivered to bedside. Awaiting transportation.

## 2018-08-29 NOTE — PROGRESS NOTES
[facility]  Heart Transplant  Progress Note    Patient Name: Colton Harley  MRN: [id number]  Admission Date: 8/19/2018  Hospital Length of Stay: 10 days  Attending Physician: Rubi Graham MD  Primary Care Provider: Primary Doctor No  Principal Problem:Acute on chronic combined systolic and diastolic heart failure    Subjective:     Interval History: Feels well. Walking all over hospital without problems.   Scheduled Meds:   amiodarone  400 mg Oral BID    doxazosin  1 mg Oral QHS    fluticasone-vilanterol  1 puff Inhalation Daily    [START ON 8/30/2018] furosemide  20 mg Oral Daily    hydrALAZINE  10 mg Oral Q8H    isosorbide dinitrate  10 mg Oral TID    magnesium oxide  400 mg Oral BID    nicotine  1 patch Transdermal Q24H    pantoprazole  40 mg Oral Daily    polyethylene glycol  17 g Oral Daily    rivaroxaban  20 mg Oral Daily with dinner    sodium chloride 0.9%  3 mL Intravenous Q8H     PRN Meds:acetaminophen, albuterol, dextrose 50%, glucagon (human recombinant), insulin aspart U-100, ondansetron    Review of patient's allergies indicates:   Allergen Reactions    Buspirone Palpitations     Objective:     Vital Signs (Most Recent):  Temp: 97.9 °F (36.6 °C) (08/29/18 0500)  Pulse: 78 (08/29/18 0700)  Resp: 18 (08/29/18 0500)  BP: 105/76 (08/29/18 0500)  SpO2: 97 % (08/29/18 0500) Vital Signs (24h Range):  Temp:  [96.3 °F (35.7 °C)-98.2 °F (36.8 °C)] 97.9 °F (36.6 °C)  Pulse:  [] 78  Resp:  [16-18] 18  SpO2:  [90 %-98 %] 97 %  BP: ()/(66-83) 105/76     Patient Vitals for the past 72 hrs (Last 3 readings):   Weight   08/29/18 0700 70.4 kg (155 lb 3.3 oz)   08/28/18 0700 69.7 kg (153 lb 10.6 oz)   08/27/18 1132 68.4 kg (150 lb 12.7 oz)     Body mass index is 24.31 kg/m².      Intake/Output Summary (Last 24 hours) at 8/29/2018 0828  Last data filed at 8/29/2018 0600  Gross per 24 hour   Intake 960 ml   Output 2501 ml   Net -1541 ml        Telemetry: NSR, no  events    Physical Exam   Constitutional: He is oriented to person, place, and time. He appears well-developed and well-nourished.   HENT:   Head: Normocephalic.   Eyes: Pupils are equal, round, and reactive to light.   Neck: Normal range of motion. Neck supple. No JVD present.   Cardiovascular: Normal rate and regular rhythm.   Murmur heard.  Pulmonary/Chest: Effort normal and breath sounds normal.   Abdominal: Soft. Bowel sounds are normal.   Musculoskeletal: Normal range of motion.   Neurological: He is alert and oriented to person, place, and time.   Skin: Skin is warm and dry.   Psychiatric: He has a normal mood and affect. His behavior is normal.   Nursing note and vitals reviewed.    Significant Labs:  CBC:  Recent Labs   Lab  08/23/18   0718   WBC  7.46   RBC  4.65   HGB  13.1*   HCT  40.8   PLT  145*   MCV  88   MCH  28.2   MCHC  32.1     BNP:  No results for input(s): BNP in the last 168 hours.    Invalid input(s): BNPTRIAGELBLO  CMP:  Recent Labs   Lab  08/27/18   0409  08/28/18   0435  08/29/18   0449   GLU  123*  130*  139*   CALCIUM  9.3  8.8  9.1   ALBUMIN  3.5  3.4*  3.5   PROT  6.6  6.4  6.6   NA  134*  134*  135*   K  4.4  4.9  4.7   CO2  25  23  26   CL  99  101  102   BUN  40*  41*  34*   CREATININE  1.7*  1.9*  1.6*   ALKPHOS  73  74  69   ALT  70*  59*  51*   AST  21  23  18   BILITOT  0.7  0.6  0.8      Coagulation:   Recent Labs   Lab  08/24/18   0549  08/25/18   0502  08/26/18   0347  08/27/18   0409  08/28/18   0435  08/29/18   0449   INR  1.0  1.0  1.3*  1.3*  1.2  1.3*   APTT  24.9  25.2  32.7*   --    --    --      LDH:  No results for input(s): LDH in the last 72 hours.  Microbiology:  Microbiology Results (last 7 days)     ** No results found for the last 168 hours. **          ABGs: No results for input(s): PH, PCO2, HCO3, POCSATURATED, BE in the last 72 hours.  I have reviewed all pertinent labs within the past 24 hours.    Estimated Creatinine Clearance: 47.6 mL/min (A) (based on  SCr of 1.6 mg/dL (H)).    Assessment and Plan:     Mr. Harley is a 58 yoM, admitted to [facility] service with acute decompensated HF and LVAD work up. 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 Thai DC-ICD (08/11/2018), ARI thrombus, COPD with Former smoker 1.5ppd x 50 years (quit 10 days ago), NIDDM type II, Hypertension, HLD, SVT, PE on Xarelto, MARK not on Cpap, GERD, cocaine use, schizophrenia, medication noncompliance. Admitted at [facility] from 8/14/18- 8/19/18 with cardiogenic shock leading to HARPAL, elevated LFTs, recurrent VT/Afib/AFL. Initially p/w for ICD placement, which was uncomplicated and d/c home following day. Day later p/w increase SOB, acute CHF and tachycardiac, his metoprolol and sotalol was increased and again discharged home following day. That evening he started felling weak with chills, presented to ER and round to have HARPAL, elevated LFTs, cardiogenic shock SBP 60-80s. Started on Dobutamine ggt and admitted to ICU, course of his admission dobutamine was weaned off with initiation of metoprolol lead to symptomatic hypotension. Interrogation of his ICD per report noted SVT in 230s. Patient seen by Cardiology and recommend further evaluation at [facility] for LVAD, poor prognosis with NYHA III/IV with significant MCCLAIN/taxing with minimal activity and further leads to SVT with subsequent shocks. Also seen by GI for elevated LFTs, likely d/t passive congestive hepatopathy, recommended to optimize HF.     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 He was first dx of HF at [facility] 01/15/18 presented with AECHF and LLL PNA. Had a LHC completed which noted non-obstructive CAD (no reports in file of this). Follows up with Cardiology Dr. Montana in [place]. Xarelto last dose 08/19/19 0800 for Afib/ AFL. Since ICD  placement it has fired x1 two days ago, [employer] Thai interrogation on 08/18/18 revealed Mode VVI, HR 40BPM, VT-1 at 166NPM to monitor, VT-2 181BPM with ATP x3 followed by DCCV, VF 230BPM ATP x1 followed by DCCV (VT rate upto 230BPM into VF zone with failure of ATP x1 and subsequent 30j dccv on 8/18/18 at 11:32am)       Work up at [facility]:   RUQ us 8/15/18 consistent with some degree of cirrhosis with portal HtN.   Cr 0.9, ALB 3.1, T bili 1.3, AST//614, CPK 63, Mag 1.9, Digoxcin 0.87, INR 3.9, Troponin 0.04, BNP 1684, Hep C / Hep B negative,   Medications held Digoxin 0.25mg, Sotalol 80mg BID, Lipitor 40mg, Doxazosin 4mg,       EKG: ST with frequent PVCs, , RBBB with , Left axis deviation, inferior lead Q-waves noted,     * Acute on chronic combined systolic and diastolic heart failure    -Diuresed extremely well since admit, RHC as above.   -Will restart Lasix at lower dose.   -Stopped lisinopril X2 secondary to HARPAL. Will initiate Bidil.  -NYHA class III symptoms. Had multiple admissions recently. Remains at high risk for HF readmissions, he is interested in Cardiomems device in view of his NYHA class III symptoms and recent HF admission, but will need to follow up as outpatient once approved.  -Ambulate as tolerated, PT/OT  -Likely DC home today.         HARPAL (acute kidney injury)    - Improving since diuresis/ACEI was held.  - Hold Nephrotoxic medications, no Contrast, Keep normotensive and euvolemic    - Re-assess BMP a few days after discharge, would need close follow-up in clinic        V-tach    -S/p Fortify [employer] Thai DC-ICD. VR 1357-40Q ICD by Dr. Johnson  On 08/10/18.  -Continue PO amio load.  -Episode of VT earlier this am noted.         Elevated LFTs    - Improving with diuresis. In the setting of congestive hepatopathy, seen by GI OSH.   - CT with normal liver attenuation  - Hep B/C non reactive.  - Holding statin therapy.         GERD (gastroesophageal reflux disease)     - continue PPI         A-fib    - Currently in NSR on EKG, hx of AFL / Afib on Xarelto.  - Maintain K > 4, Mag > 2.  - Restarted Xarelto 20mg daily now that renal function improved.  - Appreciate EP help. IV amio load completed. Continue PO load(400mg BID for 2 weeks then decrease to 200mg daily).         Pulmonary emphysema    -Pt currently smoking 1.5 PPD for the past 50 years.  -Continue Nicotine patch  -Resume Inhalers Breo, Albuterol  -O2 weaned off.   -IS / AF            Best Solorzano, NP  Heart Transplant  [facility]

## 2018-08-29 NOTE — DISCHARGE SUMMARY
[facility]  Heart Transplant  Discharge Summary      Patient Name: Colton Harley  MRN: [id number]  Admission Date: 8/19/2018  Hospital Length of Stay: 10 days  Discharge Date and Time: 08/29/2018 3:09 PM  Attending Physician: Lexie att. providers found   Discharging Provider: Best Solorzano NP  Primary Care Provider: Primary Doctor Lexie     HPI: 58 yoM, admitted to [facility] service with acute decompensated HF and LVAD work up. 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 DC-ICD (08/11/2018), ARI thrombus, COPD with Former smoker 1.5ppd x 50 years (quit 10 days ago), NIDDM type II, Hypertension, HLD, SVT, PE on Xarelto, MARK not on Cpap, GERD, cocaine use, schizophrenia, medication noncompliance. Admitted at [facility] from 8/14/18- 8/19/18 with cardiogenic shock leading to HARPAL, elevated LFTs, recurrent VT/Afib/AFL. Initially p/w for ICD placement, which was uncomplicated and d/c home following day. Day later p/w increase SOB, acute CHF and tachycardiac, his metoprolol and sotalol was increased and again discharged home following day. That evening he started felling weak with chills, presented to ER and round to have HARPAL, elevated LFTs, cardiogenic shock SBP 60-80s. Started on Dobutamine ggt and admitted to ICU, course of his admission dobutamine was weaned off with initiation of metoprolol lead to symptomatic hypotension. Interrogation of his ICD per report noted SVT in 230s. Patient seen by Cardiology and recommend further evaluation at [facility] for LVAD, poor prognosis with NYHA III/IV with significant MCCLAIN/taxing with minimal activity and further leads to SVT with subsequent shocks. Also seen by GI for elevated LFTs, likely d/t passive congestive hepatopathy, recommended to optimize HF.   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 He was first dx of HF at [facility] 01/15/18 presented with AECHF and LLL PNA. Had a LHC completed which noted non-obstructive CAD (no reports in file of this). Follows up with Cardiology Dr. [place] in [place]. Tisha last dose 08/19/19 0800 for Afib/ AFL. Since ICD placement it has fired x1 two days ago, [facility] [facility] interrogation on 08/18/18 revealed Mode VVI, HR 40BPM, VT-1 at 166NPM to monitor, VT-2 181BPM with ATP x3 followed by DCCV, VF 230BPM ATP x1 followed by DCCV (VT rate upto 230BPM into VF zone with failure of ATP x1 and subsequent 30j dccv on 8/18/18 at 11:32am)   Work up at [facility]:   RUQ us 8/15/18 consistent with some degree of cirrhosis with portal HtN.   Cr 0.9, ALB 3.1, T bili 1.3, AST//614, CPK 63, Mag 1.9, Digoxcin 0.87, INR 3.9, Troponin 0.04, BNP 1684, Hep C / Hep B negative,   Medications held Digoxin 0.25mg, Sotalol 80mg BID, Lipitor 40mg, Doxazosin 4mg,      Procedure(s) (LRB):  INSERTION, CATHETER, RIGHT HEART (Right)     Hospital Course: He was aggressively diuresed upon admit with IV Lasix. Pt responded extremely well to IV diuretics. EP was consulted and he was loaded on Amio. He remained in NSR throughout stay.  Once euvolemic, RHC was performed which revealed nl filling pressures and CI 2.8. His GDMT was titrated. He had episode of HARPAL after overdiuresis and starting ACEI/AA which were subsequently held. He was started on Bidil and Lasix was started at lower dose. He was eventually discharged home in good condition. He had money/transportation issues and is going to try to get back here for f/u but if he cannot make it, he will schedule f/u with local cardiology.     Consults (From admission, onward)        Status Ordering Provider     Inpatient consult to Electrophysiology  Once     Provider:  (Not yet assigned)    Completed JEVON, KOFFI F.        Pending Diagnostic Studies:     None        Final Active Diagnoses:    Diagnosis  Date Noted POA    PRINCIPAL PROBLEM:  Acute on chronic combined systolic and diastolic heart failure [I50.43]  Yes    HARPAL (acute kidney injury) [N17.9] 08/20/2018 Yes    V-tach [I47.2] 08/20/2018 Yes    Elevated LFTs [R79.89] 08/20/2018 Yes    Pulmonary emphysema [J43.9] 08/20/2018 Yes    A-fib [I48.91] 08/20/2018 Yes    Anxiety [F41.9] 08/20/2018 Yes    GERD (gastroesophageal reflux disease) [K21.9] 08/20/2018 Yes      Problems Resolved During this Admission:    Diagnosis Date Noted Date Resolved POA    Cardiogenic shock [R57.0] 08/20/2018 08/23/2018 Yes    NICM (nonischemic cardiomyopathy) [I42.8] 08/20/2018 08/22/2018 Yes    Hypertension [I10] 08/20/2018 08/21/2018 Yes    HLD (hyperlipidemia) [E78.5] 08/20/2018 08/22/2018 Yes    Acute decompensated heart failure [I50.9] 08/19/2018 08/21/2018 Yes      Discharged Condition: good    Disposition: Home or Self Care    Patient Instructions:   No discharge procedures on file.  Medications:  Reconciled Home Medications:      Medication List      START taking these medications    amiodarone 400 MG tablet  Commonly known as:  PACERONE  Take 1 tablet (400mg total) by mouth twice a day until 9/5/18, then transition to take 1 tablet 400mg by mouth daily.     doxazosin 1 MG tablet  Commonly known as:  CARDURA  Take 1 tablet (1 mg total) by mouth every evening.     hydrALAZINE 10 MG tablet  Commonly known as:  APRESOLINE  Take 1 tablet (10 mg total) by mouth every 8 (eight) hours.     isosorbide dinitrate 10 MG tablet  Commonly known as:  ISORDIL  Take 1 tablet (10 mg total) by mouth 3 (three) times daily.     magnesium oxide 400 mg tablet  Commonly known as:  MAG-OX  Take 1 tablet (400 mg total) by mouth 2 (two) times daily.        CHANGE how you take these medications    furosemide 20 MG tablet  Commonly known as:  LASIX  Take 1 tablet (20 mg total) by mouth once daily.  What changed:  when to take this     metoprolol succinate 25 MG 24 hr tablet  Commonly known  as:  TOPROL-XL  Take ½ tablet (12.5 mg total) by mouth once daily.  What changed:    · how much to take  · when to take this     XARELTO 10 mg Tab  Generic drug:  rivaroxaban  Take 2 tablets (20 mg total) by mouth daily with dinner or evening meal.  What changed:  how much to take        CONTINUE taking these medications    BREO ELLIPTA 200-25 mcg/dose Dsdv diskus inhaler  Generic drug:  fluticasone-vilanterol  Inhale 1 puff into the lungs once daily. Controller     dextrose 50% (D50W) solution  Inject 25 g into the vein daily as needed (if cannot take PO and BG <40).     glucagon HCl 1 mg Solr  Inject as directed as needed.     GLUTOSE 15 ORAL  Take by mouth daily as needed (<60 BG).     insulin  unit/mL injection  Commonly known as:  NovoLIN N  Inject into the skin 4 (four) times daily before meals and nightly.     mupirocin calcium 2% 2 % cream  Commonly known as:  BACTROBAN  Apply topically 2 (two) times daily.     nicotine 21 mg/24 hr  Commonly known as:  NICODERM CQ  Place 1 patch onto the skin every 24 hours.     ondansetron 4 MG tablet  Commonly known as:  ZOFRAN  Take 4 mg by mouth as needed for Nausea.     pantoprazole 40 MG tablet  Commonly known as:  PROTONIX  Take 40 mg by mouth once daily.        STOP taking these medications    doxycycline 100 MG Cap  Commonly known as:  VIBRAMYCIN            Best Solorzano NP  Heart Transplant  [facility]

## 2018-08-29 NOTE — PLAN OF CARE
Problem: Patient Care Overview  Goal: Plan of Care Review  Outcome: Ongoing (interventions implemented as appropriate)  Pt remains free from falls and injury during shift. Pt not d/c because of elevated LF. BUN 41 creatinine 1.9. Blood glucose monitored, PRN insulin given per sliding scale. VSS. No CP, SOB or dizziness. Pt ambulated and informed RN when he left Unit. Advised to not go outside with cardiac monitor orders. POC updated with pt, awaiting further insurance inquiery for another way home upon DC. Will continue to monitor.

## 2018-08-29 NOTE — ASSESSMENT & PLAN NOTE
-Diuresed extremely well since admit, RHC as above.   -Will restart Lasix at lower dose.   -Stopped lisinopril X2 secondary to HARPAL. Will initiate Bidil.  -NYHA class III symptoms. Had multiple admissions recently. Remains at high risk for HF readmissions, he is interested in Cardiomems device in view of his NYHA class III symptoms and recent HF admission, but will need to follow up as outpatient once approved.  -Ambulate as tolerated, PT/OT  -Likely DC home today.